# Patient Record
Sex: FEMALE | Race: WHITE | HISPANIC OR LATINO | Employment: FULL TIME
[De-identification: names, ages, dates, MRNs, and addresses within clinical notes are randomized per-mention and may not be internally consistent; named-entity substitution may affect disease eponyms.]

---

## 2017-02-24 ENCOUNTER — RECORDS - HEALTHEAST (OUTPATIENT)
Dept: ADMINISTRATIVE | Facility: OTHER | Age: 52
End: 2017-02-24

## 2017-02-24 LAB
LAB AP CHARGES (HE HISTORICAL CONVERSION): NORMAL
PATH REPORT.COMMENTS IMP SPEC: NORMAL
PATH REPORT.COMMENTS IMP SPEC: NORMAL
PATH REPORT.FINAL DX SPEC: NORMAL
PATH REPORT.GROSS SPEC: NORMAL
PATH REPORT.MICROSCOPIC SPEC OTHER STN: NORMAL
PATH REPORT.RELEVANT HX SPEC: NORMAL
RESULT FLAG (HE HISTORICAL CONVERSION): NORMAL

## 2018-02-14 ENCOUNTER — AMBULATORY - HEALTHEAST (OUTPATIENT)
Dept: ADMINISTRATIVE | Facility: REHABILITATION | Age: 53
End: 2018-02-14

## 2018-02-14 DIAGNOSIS — H81.13 BPPV (BENIGN PAROXYSMAL POSITIONAL VERTIGO), BILATERAL: ICD-10-CM

## 2018-05-07 ENCOUNTER — RECORDS - HEALTHEAST (OUTPATIENT)
Dept: ADMINISTRATIVE | Facility: OTHER | Age: 53
End: 2018-05-07

## 2018-12-05 ENCOUNTER — RECORDS - HEALTHEAST (OUTPATIENT)
Dept: ADMINISTRATIVE | Facility: OTHER | Age: 53
End: 2018-12-05

## 2018-12-14 ASSESSMENT — MIFFLIN-ST. JEOR: SCORE: 1399.21

## 2018-12-19 ENCOUNTER — ANESTHESIA - HEALTHEAST (OUTPATIENT)
Dept: SURGERY | Facility: CLINIC | Age: 53
End: 2018-12-19

## 2018-12-19 ENCOUNTER — SURGERY - HEALTHEAST (OUTPATIENT)
Dept: SURGERY | Facility: CLINIC | Age: 53
End: 2018-12-19

## 2018-12-19 ASSESSMENT — MIFFLIN-ST. JEOR: SCORE: 1385.61

## 2021-05-25 ENCOUNTER — RECORDS - HEALTHEAST (OUTPATIENT)
Dept: ADMINISTRATIVE | Facility: CLINIC | Age: 56
End: 2021-05-25

## 2021-05-26 ENCOUNTER — RECORDS - HEALTHEAST (OUTPATIENT)
Dept: ADMINISTRATIVE | Facility: CLINIC | Age: 56
End: 2021-05-26

## 2021-05-27 ENCOUNTER — RECORDS - HEALTHEAST (OUTPATIENT)
Dept: ADMINISTRATIVE | Facility: CLINIC | Age: 56
End: 2021-05-27

## 2021-06-02 VITALS — HEIGHT: 61 IN | BODY MASS INDEX: 35.3 KG/M2 | WEIGHT: 187 LBS

## 2021-06-05 ENCOUNTER — HEALTH MAINTENANCE LETTER (OUTPATIENT)
Age: 56
End: 2021-06-05

## 2021-06-22 NOTE — ANESTHESIA CARE TRANSFER NOTE
Last vitals:   Vitals:    12/19/18 1145   BP: 106/60   Pulse: 81   Resp: 15   Temp: 36.8  C (98.2  F)   SpO2: 98%     Patient's level of consciousness is drowsy  Spontaneous respirations: yes  Maintains airway independently: yes  Dentition unchanged: yes  Oropharynx: oropharynx clear of all foreign objects    QCDR Measures:  ASA# 20 - Surgical Safety Checklist: WHO surgical safety checklist completed prior to induction    PQRS# 430 - Adult PONV Prevention: 4558F - Pt received => 2 anti-emetic agents (different classes) preop & intraop  ASA# 8 - Peds PONV Prevention: NA - Not pediatric patient, not GA or 2 or more risk factors NOT present  PQRS# 424 - Phyllis-op Temp Management: 4559F - At least one body temp DOCUMENTED => 35.5C or 95.9F within required timeframe  PQRS# 426 - PACU Transfer Protocol: - Transfer of care checklist used  ASA# 14 - Acute Post-op Pain: ASA14B - Patient did NOT experience pain >= 7 out of 10

## 2021-06-22 NOTE — ANESTHESIA PREPROCEDURE EVALUATION
Anesthesia Evaluation      Patient summary reviewed   No history of anesthetic complications     Airway   Mallampati: II   Pulmonary     breath sounds clear to auscultation  (+) shortness of breath, a smoker                         Cardiovascular - normal exam  (+) hypertension, ,      Neuro/Psych    (+) anxiety/panic attacks,     Endo/Other    (+) diabetes mellitus,      GI/Hepatic/Renal - negative ROS           Dental                         Anesthesia Plan  Planned anesthetic: MAC    ASA 2     Anesthetic plan and risks discussed with: patient

## 2021-06-22 NOTE — ANESTHESIA POSTPROCEDURE EVALUATION
Patient: Alanna Lewis Ling  RETROPUBIC SLING (LYNX), CYSTOSCOPY  Anesthesia type: MAC    Patient location: PACU  Last vitals:   Vitals:    12/19/18 1300   BP: 106/62   Pulse: 72   Resp: 16   Temp:    SpO2:      Post vital signs: stable  Level of consciousness: awake and responds to simple questions  Post-anesthesia pain: pain controlled  Post-anesthesia nausea and vomiting: no  Pulmonary: unassisted, return to baseline  Cardiovascular: stable and blood pressure at baseline  Hydration: adequate  Anesthetic events: no    QCDR Measures:  ASA# 11 - Phyllis-op Cardiac Arrest: ASA11B - Patient did NOT experience unanticipated cardiac arrest  ASA# 12 - Phyllis-op Mortality Rate: ASA12B - Patient did NOT die  ASA# 13 - PACU Re-Intubation Rate: ASA13B - Patient did NOT require a new airway mgmt  ASA# 10 - Composite Anes Safety: ASA10A - No serious adverse event    Additional Notes:

## 2021-07-21 ENCOUNTER — RECORDS - HEALTHEAST (OUTPATIENT)
Dept: ADMINISTRATIVE | Facility: CLINIC | Age: 56
End: 2021-07-21

## 2021-08-18 ENCOUNTER — APPOINTMENT (OUTPATIENT)
Dept: RADIOLOGY | Facility: CLINIC | Age: 56
End: 2021-08-18
Payer: COMMERCIAL

## 2021-08-18 ENCOUNTER — HOSPITAL ENCOUNTER (EMERGENCY)
Facility: CLINIC | Age: 56
Discharge: HOME | End: 2021-08-18
Attending: EMERGENCY MEDICINE | Admitting: EMERGENCY MEDICINE
Payer: COMMERCIAL

## 2021-08-18 VITALS
OXYGEN SATURATION: 95 % | DIASTOLIC BLOOD PRESSURE: 76 MMHG | HEART RATE: 66 BPM | HEIGHT: 61 IN | SYSTOLIC BLOOD PRESSURE: 139 MMHG | WEIGHT: 209.9 LBS | BODY MASS INDEX: 39.63 KG/M2 | RESPIRATION RATE: 18 BRPM

## 2021-08-18 DIAGNOSIS — S46.812A STRAIN OF LEFT TRAPEZIUS MUSCLE, INITIAL ENCOUNTER: Primary | ICD-10-CM

## 2021-08-18 LAB
ANION GAP SERPL CALCULATED.3IONS-SCNC: 8 MMOL/L (ref 5–18)
ATRIAL RATE - MUSE: 78 BPM
BASOPHILS # BLD AUTO: 0 10E3/UL (ref 0–0.2)
BASOPHILS NFR BLD AUTO: 0 %
BUN SERPL-MCNC: 12 MG/DL (ref 8–22)
CALCIUM SERPL-MCNC: 10.1 MG/DL (ref 8.5–10.5)
CHLORIDE BLD-SCNC: 105 MMOL/L (ref 98–107)
CO2 SERPL-SCNC: 26 MMOL/L (ref 22–31)
CREAT SERPL-MCNC: 0.74 MG/DL (ref 0.6–1.1)
D DIMER PPP FEU-MCNC: 0.32 UG/ML FEU (ref 0–0.5)
DIASTOLIC BLOOD PRESSURE - MUSE: 79 MMHG
EOSINOPHIL # BLD AUTO: 0.2 10E3/UL (ref 0–0.7)
EOSINOPHIL NFR BLD AUTO: 3 %
ERYTHROCYTE [DISTWIDTH] IN BLOOD BY AUTOMATED COUNT: 13.1 % (ref 10–15)
GFR SERPL CREATININE-BSD FRML MDRD: >90 ML/MIN/1.73M2
GLUCOSE BLD-MCNC: 141 MG/DL (ref 70–125)
HCT VFR BLD AUTO: 39.1 % (ref 35–47)
HGB BLD-MCNC: 12.4 G/DL (ref 11.7–15.7)
IMM GRANULOCYTES # BLD: 0 10E3/UL
IMM GRANULOCYTES NFR BLD: 0 %
INTERPRETATION ECG - MUSE: NORMAL
LYMPHOCYTES # BLD AUTO: 2.4 10E3/UL (ref 0.8–5.3)
LYMPHOCYTES NFR BLD AUTO: 35 %
MCH RBC QN AUTO: 27.5 PG (ref 26.5–33)
MCHC RBC AUTO-ENTMCNC: 31.7 G/DL (ref 31.5–36.5)
MCV RBC AUTO: 87 FL (ref 78–100)
MONOCYTES # BLD AUTO: 0.5 10E3/UL (ref 0–1.3)
MONOCYTES NFR BLD AUTO: 8 %
NEUTROPHILS # BLD AUTO: 3.7 10E3/UL (ref 1.6–8.3)
NEUTROPHILS NFR BLD AUTO: 54 %
NRBC # BLD AUTO: 0 10E3/UL
NRBC BLD AUTO-RTO: 0 /100
P AXIS - MUSE: 59 DEGREES
PLATELET # BLD AUTO: 329 10E3/UL (ref 150–450)
POTASSIUM BLD-SCNC: 4.1 MMOL/L (ref 3.5–5)
PR INTERVAL - MUSE: 142 MS
QRS DURATION - MUSE: 68 MS
QT - MUSE: 380 MS
QTC - MUSE: 433 MS
R AXIS - MUSE: 41 DEGREES
RBC # BLD AUTO: 4.51 10E6/UL (ref 3.8–5.2)
SODIUM SERPL-SCNC: 139 MMOL/L (ref 136–145)
SYSTOLIC BLOOD PRESSURE - MUSE: 151 MMHG
T AXIS - MUSE: 44 DEGREES
TROPONIN I SERPL-MCNC: <0.01 NG/ML (ref 0–0.29)
VENTRICULAR RATE- MUSE: 78 BPM
WBC # BLD AUTO: 6.8 10E3/UL (ref 4–11)

## 2021-08-18 PROCEDURE — 93005 ELECTROCARDIOGRAM TRACING: CPT | Performed by: EMERGENCY MEDICINE

## 2021-08-18 PROCEDURE — 85379 FIBRIN DEGRADATION QUANT: CPT | Performed by: STUDENT IN AN ORGANIZED HEALTH CARE EDUCATION/TRAINING PROGRAM

## 2021-08-18 PROCEDURE — 84484 ASSAY OF TROPONIN QUANT: CPT | Performed by: STUDENT IN AN ORGANIZED HEALTH CARE EDUCATION/TRAINING PROGRAM

## 2021-08-18 PROCEDURE — 99284 EMERGENCY DEPT VISIT MOD MDM: CPT | Mod: 25

## 2021-08-18 PROCEDURE — 85025 COMPLETE CBC W/AUTO DIFF WBC: CPT | Performed by: STUDENT IN AN ORGANIZED HEALTH CARE EDUCATION/TRAINING PROGRAM

## 2021-08-18 PROCEDURE — 250N000013 HC RX MED GY IP 250 OP 250 PS 637: Performed by: STUDENT IN AN ORGANIZED HEALTH CARE EDUCATION/TRAINING PROGRAM

## 2021-08-18 PROCEDURE — 80048 BASIC METABOLIC PNL TOTAL CA: CPT | Performed by: STUDENT IN AN ORGANIZED HEALTH CARE EDUCATION/TRAINING PROGRAM

## 2021-08-18 PROCEDURE — 71045 X-RAY EXAM CHEST 1 VIEW: CPT

## 2021-08-18 PROCEDURE — 36415 COLL VENOUS BLD VENIPUNCTURE: CPT | Performed by: STUDENT IN AN ORGANIZED HEALTH CARE EDUCATION/TRAINING PROGRAM

## 2021-08-18 PROCEDURE — 93005 ELECTROCARDIOGRAM TRACING: CPT

## 2021-08-18 RX ORDER — OXYCODONE HYDROCHLORIDE 5 MG/1
5 TABLET ORAL ONCE
Status: DISCONTINUED | OUTPATIENT
Start: 2021-08-18 | End: 2021-08-18 | Stop reason: HOSPADM

## 2021-08-18 RX ORDER — ACETAMINOPHEN 325 MG/1
650 TABLET ORAL ONCE
Status: COMPLETED | OUTPATIENT
Start: 2021-08-18 | End: 2021-08-18

## 2021-08-18 RX ADMIN — ACETAMINOPHEN 650 MG: 325 TABLET ORAL at 09:20

## 2021-08-18 ASSESSMENT — ENCOUNTER SYMPTOMS
ENDOCRINE NEGATIVE: 1
HEADACHES: 0
COLOR CHANGE: 0
DYSURIA: 0
NERVOUS/ANXIOUS: 1
DIZZINESS: 0
ABDOMINAL PAIN: 0
PALPITATIONS: 0
NAUSEA: 0
HEMATOLOGIC/LYMPHATIC NEGATIVE: 1
CHEST TIGHTNESS: 1
EYES NEGATIVE: 1
CONSTIPATION: 1
CONSTITUTIONAL NEGATIVE: 1
FREQUENCY: 0
DIARRHEA: 0
ALLERGIC/IMMUNOLOGIC NEGATIVE: 1
BACK PAIN: 1

## 2021-08-18 ASSESSMENT — MIFFLIN-ST. JEOR: SCORE: 1489.48

## 2021-08-18 NOTE — ED TRIAGE NOTES
Patient states intermittent left sided chest pain for 3 days that started in her back, awoke today and was worse.  History of anxiety, HTN, takes medication for anxiety.

## 2021-08-18 NOTE — ED PROVIDER NOTES
Emergency Department Encounter     Evaluation Date & Time:   8/18/2021  8:37 AM    CHIEF COMPLAINT:  Chest Pain      Triage Note:Patient states intermittent left sided chest pain for 3 days that started in her back, awoke today and was worse.  History of anxiety, HTN, takes medication for anxiety.        Impression and Plan     ED COURSE & MEDICAL DECISION MAKING:    Intermittent left sided chest pain that began in her back, exam shows reproducible tenderness of left trapezius, negative troponin, d-dimer and EKG. CXR clear for pneumonia. Ruled out PE, dissection given normal dimer. More likely trapezius muscle strain. Agreeable to tylenol for symptom management.    At the conclusion of the encounter I discussed the results of all the tests and the disposition. The questions were answered. The patient or family acknowledged understanding and was agreeable with the care plan.    FINAL IMPRESSION:  No diagnosis found.      Reassessments & Consults       MEDICATIONS GIVEN IN THE EMERGENCY DEPARTMENT:  Medications   oxyCODONE (ROXICODONE) tablet 5 mg (has no administration in time range)   acetaminophen (TYLENOL) tablet 650 mg (650 mg Oral Given 8/18/21 0920)       NEW PRESCRIPTIONS STARTED AT TODAY'S ED VISIT:  New Prescriptions    No medications on file       HPI     HPI     Alanna Ling is a 56 year old female with a pertinent history of HTN, anxiety, T2DM who presents to this ED [facility] for evaluation of back pain that radiates to her chest that started 3 days ago. No history of mechanical back trauma or injury, does walk the dogs which pulls on her right arm. Pain was initially medial to her left scapula and now radiates to her chest, her pain is pleuritic, has not taken any medication for her symptoms. No previous MI/ACS history, no hx of DVT, no PE, not on OCPs. No systemic symptoms.    REVIEW OF SYSTEMS:  Review of Systems   Constitutional: Negative.    HENT: Negative.    Eyes: Negative.    Respiratory:  Positive for chest tightness.    Cardiovascular: Positive for chest pain. Negative for palpitations.   Gastrointestinal: Positive for constipation. Negative for abdominal pain, diarrhea and nausea.   Endocrine: Negative.    Genitourinary: Negative for dysuria and frequency.   Musculoskeletal: Positive for back pain.   Skin: Negative for color change.   Allergic/Immunologic: Negative.    Neurological: Negative for dizziness and headaches.   Hematological: Negative.    Psychiatric/Behavioral: The patient is nervous/anxious.      10 point ROS of systems including Constitutional, Eyes, Respiratory, Cardiovascular, Gastroenterology, Genitourinary, Integumentary, Muscularskeletal, Psychiatric were all negative except for pertinent positives noted in my HPI.      Medical History     No past medical history on file.    Past Surgical History:   Procedure Laterality Date     APPENDECTOMY       HC SLING OPERATION FOR STRESS INCONTINENCE N/A 12/19/2018    Procedure: RETROPUBIC SLING (LYNX), CYSTOSCOPY;  Surgeon: Yeyo Pickett MD;  Location: [facility];  Service: Urology     OTHER SURGICAL HISTORY  20 yrs ago    butt implants     TONSILLECTOMY         Family History   Problem Relation Age of Onset     Cancer Paternal Grandfather      Obesity Mother      Hypertension Mother      Arthritis Mother      Heart Disease Father      Hyperlipidemia Father      Hypertension Father      Cerebrovascular Disease Father      Obesity Sister      Hypertension Sister      Hyperlipidemia Brother      Hypertension Brother      Cancer Paternal Grandmother        Social History     Tobacco Use     Smoking status: Former Smoker     Smokeless tobacco: Never Used     Tobacco comment: quit 30 yrs ago   Substance Use Topics     Alcohol use: Yes     Comment: Alcoholic Drinks/day: Socially     Drug use: No       cholecalciferol, vitamin D3, (VITAMIN D3) 2,000 unit Tab  losartan-hydrochlorothiazide (HYZAAR) 50-12.5 mg per tablet  metFORMIN  "(GLUCOPHAGE-XR) 500 MG 24 hr tablet  oxyCODONE-acetaminophen (PERCOCET) 5-325 mg per tablet  rizatriptan (MAXALT-MLT) 10 MG disintegrating tablet  sertraline (ZOLOFT) 100 MG tablet        Physical Exam     First Vitals:  Patient Vitals for the past 24 hrs:   BP Pulse Resp SpO2 Height Weight   08/18/21 1019 131/77 70 16 98 % -- --   08/18/21 0900 127/72 76 15 99 % -- --   08/18/21 0839 (!) 151/79 -- -- -- 1.549 m (5' 1\") 95.2 kg (209 lb 14.4 oz)       PHYSICAL EXAM:   Physical Exam  Vitals reviewed.   Constitutional:       Appearance: She is well-developed.   HENT:      Head: Normocephalic.   Eyes:      Extraocular Movements: Extraocular movements intact.      Pupils: Pupils are equal, round, and reactive to light.   Cardiovascular:      Rate and Rhythm: Normal rate and regular rhythm.      Heart sounds: Normal heart sounds.   Pulmonary:      Effort: Pulmonary effort is normal.      Breath sounds: Normal breath sounds.   Chest:      Chest wall: No mass or tenderness.   Abdominal:      General: Bowel sounds are normal.      Palpations: Abdomen is soft.   Musculoskeletal:      Cervical back: Normal range of motion.      Comments: Reproducible tenderness on the right Trapezoid   Skin:     General: Skin is warm.      Capillary Refill: Capillary refill takes less than 2 seconds.   Neurological:      General: No focal deficit present.      Mental Status: She is alert. She is disoriented.   Psychiatric:         Mood and Affect: Mood normal.         Behavior: Behavior normal.         Results     LAB:  All pertinent labs reviewed and interpreted  Labs Ordered and Resulted from Time of ED Arrival Up to the Time of Departure from the ED   BASIC METABOLIC PANEL - Abnormal; Notable for the following components:       Result Value    Glucose 141 (*)     All other components within normal limits   TROPONIN I - Normal   D DIMER QUANTITATIVE - Normal    Narrative:     This D-dimer assay is intended for use in conjunction with a " clinical pretest probability assessment model to exclude pulmonary embolism (PE) and deep venous thrombosis (DVT) in outpatients suspected of PE or DVT. The cut-off value is 0.50 ug/mL FEU.   CBC WITH PLATELETS & DIFFERENTIAL    Narrative:     The following orders were created for panel order CBC with platelets differential.  Procedure                               Abnormality         Status                     ---------                               -----------         ------                     CBC with platelets and d...[[id number]]                      Final result                 Please view results for these tests on the individual orders.   CBC WITH PLATELETS AND DIFFERENTIAL   PERIPHERAL IV CATHETER       RADIOLOGY:  XR Chest Port 1 View   Final Result   IMPRESSION: Negative chest.                 ECG:  EKG was done.  Performed at: ED    Impression: Normal no prior comparison    Rate: 78  Rhythm: NSR  MD Interval: 142  QRS Interval: 68  QTc Interval: 433  ST Changes: None  Comparison: No prior      I have independently reviewed and interpreted the EKS(s) documented above    Benedicto Elmore MD  Emergency Medicine  [facility] EMERGENCY ROOM       Catarina, Benedicto Bojorquez MD  Resident  08/18/21 0645

## 2021-08-18 NOTE — ED PROVIDER NOTES
"ED CONSULTATION  Date/Time:8/18/2021 9:13 AM    I am seeing this patient along with the resident.  I, Lyn Castillo, DO have reviewed the documentation, personally taken the patient's history, performed an exam and agree with the physical finds, diagnosis and management plan.    HPI: Alanna Ling is a 56 year old female with a pertinent history of HTN, anxiety, T2DM who presents to this ED [facility] for evaluation of back pain that radiates to her chest that started 3 days ago. No history of mechanical back trauma or injury, does walk the dogs which pulls on her right arm. Pain was initially medial to her left scapula and now radiates to her chest, her pain is pleuritic, has not taken any medication for her symptoms. No previous MI/ACS history, no hx of DVT, no PE, not on OCPs. No systemic symptoms.    Physical Exam:/76   Pulse 66   Resp 18   Ht 1.549 m (5' 1\")   Wt 95.2 kg (209 lb 14.4 oz)   SpO2 95%   BMI 39.66 kg/m    Constitutional:  Well developed, Well nourished  HENT:  Normocephalic, Atraumatic, Bilateral external ears normal, Oropharynx moist, No oral exudates, Nose normal. Neck- Normal range of motion   Eyes: Conjunctiva normal, No discharge.   Respiratory:  Normal breath sounds, No respiratory distress, No wheezing  Cardiovascular:  Normal heart rate, Normal rhythm. Reproducible left anterior chest wall and left scapula tenderness. 2+ radial pulses bilaterally  GI:  Soft, No tenderness, No masses, No flank tenderness.   Musculoskeletal: Full ROM  Integument:  Warm, Dry, No erythema, No rash.    Neurologic:  Alert & oriented.  No focal deficits appreciated  Psychiatric:  Affect normal, Judgment normal, Mood normal.     EKG:    Performed at: 8:44 AM    Rate: 78  Rhythm: sinus rhythm  Axis: 41  LA: 142  QRS: 68  QTC: 433  Sinus rhythm, septal infarct, age undetermined   Comparison: No previous available for comparison.    I have independently review and interpreted the EKG, pending final " cardiology read.      MDM: Patient presenting for evaluation of left-sided chest pain.  Symptoms started this evening.  She has been having a few days of left scapular tenderness.  No known trauma.  Now wraparound of from peer exacerbated by deep breath and movement.  EKG without acute ischemic findings.  Some of her pain is reproducible.  Given the pleuritic nature, will obtain a D-dimer.  She is low risk Wells.  Not consistent with a dissection.  D-dimer negative.  Troponin negative.  Labs otherwise unremarkable.  CXR normal.  Symptoms most consistent with a musculoskeletal cause.  Discussed symptomatic care and return precautions.         1. Strain of left trapezius muscle, initial encounter        Results for orders placed or performed during the hospital encounter of 08/18/21   XR Chest Port 1 View    Impression    IMPRESSION: Negative chest.   Basic metabolic panel   Result Value Ref Range    Sodium 139 136 - 145 mmol/L    Potassium 4.1 3.5 - 5.0 mmol/L    Chloride 105 98 - 107 mmol/L    Carbon Dioxide (CO2) 26 22 - 31 mmol/L    Anion Gap 8 5 - 18 mmol/L    Urea Nitrogen 12 8 - 22 mg/dL    Creatinine 0.74 0.60 - 1.10 mg/dL    Calcium 10.1 8.5 - 10.5 mg/dL    Glucose 141 (H) 70 - 125 mg/dL    GFR Estimate >90 >60 mL/min/1.73m2   Result Value Ref Range    Troponin I <0.01 0.00 - 0.29 ng/mL   D dimer quantitative   Result Value Ref Range    D-Dimer Quantitative 0.32 0.00 - 0.50 ug/mL FEU   CBC with platelets and differential   Result Value Ref Range    WBC Count 6.8 4.0 - 11.0 10e3/uL    RBC Count 4.51 3.80 - 5.20 10e6/uL    Hemoglobin 12.4 11.7 - 15.7 g/dL    Hematocrit 39.1 35.0 - 47.0 %    MCV 87 78 - 100 fL    MCH 27.5 26.5 - 33.0 pg    MCHC 31.7 31.5 - 36.5 g/dL    RDW 13.1 10.0 - 15.0 %    Platelet Count 329 150 - 450 10e3/uL    % Neutrophils 54 %    % Lymphocytes 35 %    % Monocytes 8 %    % Eosinophils 3 %    % Basophils 0 %    % Immature Granulocytes 0 %    NRBCs per 100 WBC 0 <1 /100    Absolute  Neutrophils 3.7 1.6 - 8.3 10e3/uL    Absolute Lymphocytes 2.4 0.8 - 5.3 10e3/uL    Absolute Monocytes 0.5 0.0 - 1.3 10e3/uL    Absolute Eosinophils 0.2 0.0 - 0.7 10e3/uL    Absolute Basophils 0.0 0.0 - 0.2 10e3/uL    Absolute Immature Granulocytes 0.0 <=0.0 10e3/uL    Absolute NRBCs 0.0 10e3/uL   ECG 12-LEAD WITH MUSE (LHE)   Result Value Ref Range    Systolic Blood Pressure 151 mmHg    Diastolic Blood Pressure 79 mmHg    Ventricular Rate 78 BPM    Atrial Rate 78 BPM    SC Interval 142 ms    QRS Duration 68 ms     ms    QTc 433 ms    P Axis 59 degrees    R AXIS 41 degrees    T Axis 44 degrees    Interpretation ECG       Sinus rhythm  Septal infarct , age undetermined  Abnormal ECG  No previous ECGs available  Confirmed by SEE ED PROVIDER NOTE FOR, ECG INTERPRETATION ([id number]),  MARLY SANCHEZ ([id number]) on 8/18/2021 4:48:44 PM       XR Chest Port 1 View   Final Result   IMPRESSION: Negative chest.           Discharge Medication List as of 8/18/2021 11:23 AM            DO Jonathan Rosas Christine E, DO  08/19/21 1136

## 2021-09-25 ENCOUNTER — HEALTH MAINTENANCE LETTER (OUTPATIENT)
Age: 56
End: 2021-09-25

## 2021-11-29 NOTE — TELEPHONE ENCOUNTER
RECORDS RECEIVED FROM: Care Everywhere   Appt Date: 11.30.2021   NOTES STATUS DETAILS   OFFICE NOTE from referring provider N/A    OFFICE NOTES from other specialists [facility] Everywhere 11.01.2021 Isai Brown,      DISCHARGE SUMMARY from hospital [facility] Everywhere 11.06.2021 [facility]   MEDICATION LIST Care Everywhere / Internal    LIVER BIOSPY (IF APPLICABLE)      PATHOLOGY REPORTS  N/A    IMAGING     ENDOSCOPY (IF AVAILABLE) N/A    COLONOSCOPY (IF AVAILABLE) [facility] Everywhere 10.17.2019   ULTRASOUND LIVER N/A    CT OF ABDOMEN N/A    MRI OF LIVER N/A    FIBROSCAN, US ELASTOGRAPHY, FIBROSIS SCAN, MR ELASTOGRAPHY N/A    LABS     HEPATIC PANEL (LIVER PANEL) Care Every[facility] 11.10.2021   BASIC METABOLIC PANEL Care Everywhere 09.23.2021   COMPLETE METABOLIC PANEL Care Everywhere 11.10.2021   COMPLETE BLOOD COUNT (CBC) Care Everywhere 11.10.2021   INTERNATIONAL NORMALIZED RATIO (INR) Care Everywhere 11.10.2021   HEPATITIS C ANTIBODY N/A    HEPATITIS C VIRAL LOAD/PCR N/A    HEPATITIS C GENOTYPE N/A    HEPATITIS B SURFACE ANTIGEN N/A    HEPATITIS B SURFACE ANTIBODY Care Everywhere 10.11.2021   HEPATITIS B DNA QUANT LEVEL N/A    HEPATITIS B CORE ANTIBODY N/A

## 2021-11-30 ENCOUNTER — PRE VISIT (OUTPATIENT)
Dept: GASTROENTEROLOGY | Facility: CLINIC | Age: 56
End: 2021-11-30
Payer: COMMERCIAL

## 2021-11-30 ENCOUNTER — OFFICE VISIT (OUTPATIENT)
Dept: GASTROENTEROLOGY | Facility: CLINIC | Age: 56
End: 2021-11-30
Attending: INTERNAL MEDICINE
Payer: COMMERCIAL

## 2021-11-30 VITALS
TEMPERATURE: 98.4 F | HEIGHT: 61 IN | SYSTOLIC BLOOD PRESSURE: 118 MMHG | HEART RATE: 82 BPM | WEIGHT: 206.3 LBS | BODY MASS INDEX: 38.95 KG/M2 | DIASTOLIC BLOOD PRESSURE: 77 MMHG | OXYGEN SATURATION: 96 %

## 2021-11-30 DIAGNOSIS — K76.0 FATTY LIVER DISEASE, NONALCOHOLIC: Primary | ICD-10-CM

## 2021-11-30 DIAGNOSIS — I10 ESSENTIAL HYPERTENSION: ICD-10-CM

## 2021-11-30 DIAGNOSIS — E66.01 CLASS 2 SEVERE OBESITY DUE TO EXCESS CALORIES WITH SERIOUS COMORBIDITY AND BODY MASS INDEX (BMI) OF 38.0 TO 38.9 IN ADULT (H): ICD-10-CM

## 2021-11-30 DIAGNOSIS — Z01.818 PRE-OP EXAM: ICD-10-CM

## 2021-11-30 DIAGNOSIS — E88.819 INSULIN RESISTANCE: ICD-10-CM

## 2021-11-30 DIAGNOSIS — E66.812 CLASS 2 SEVERE OBESITY DUE TO EXCESS CALORIES WITH SERIOUS COMORBIDITY AND BODY MASS INDEX (BMI) OF 38.0 TO 38.9 IN ADULT (H): ICD-10-CM

## 2021-11-30 PROCEDURE — 99204 OFFICE O/P NEW MOD 45 MIN: CPT | Performed by: INTERNAL MEDICINE

## 2021-11-30 RX ORDER — HYDROCHLOROTHIAZIDE 12.5 MG/1
25 CAPSULE ORAL
COMMUNITY
Start: 2021-04-26 | End: 2024-06-20

## 2021-11-30 ASSESSMENT — MIFFLIN-ST. JEOR: SCORE: 1473.15

## 2021-11-30 ASSESSMENT — PAIN SCALES - GENERAL: PAINLEVEL: NO PAIN (0)

## 2021-11-30 NOTE — Clinical Note
Can you please fax the hepatic panel order (for 2 weeks from now) to [facility]    Grete - can you follow up these labs in 2 weeks?  If same or elevated - perc liver biopsy.  If improved further, proceed with sleeve gastrectomy

## 2021-11-30 NOTE — PROGRESS NOTES
Hepatic panel order faxed to [facility] to be drawn in 2 weeks. As requested by Dr. Leventhal.    Roxann KEMP LPN  [facility]atology Clinic

## 2021-11-30 NOTE — LETTER
11/30/2021    RE: Alanna Ling  [address]  [facility] [address]    Dear Colleague,    Thank you for referring your patient, Alanna Ling, to the [facility] [place]. Please see a copy of my visit note below.    Date of Service: 11/30/2021     Referring Provider: MERRY Young    Subjective:            Alanna Ling is a 56 year old female presenting for evaluation of abnormal liver tests    History of Present Illness   Alanna Ling is a 56 year old female with past medical history of obesity, hypertension, insulin resistance who presents with concerns of abnormal liver tests.    She reports that she has been overweight for much of her adult life.  Notes for many years she was very active physically, and working out several times per week.  Noted that her mother and sister have had issues with insulin resistance and fatty liver disease.  Notes that since the pandemic she has had less activity overall.  Despite her aggressive attempts at weight loss including dieting and exercise, she has had limited success and so she had been interested in pursuing a bariatric option.  She was seen in consultation with bariatric surgery and was scheduled for a sleeve gastrectomy on November 6.  Unfortunately she did not undergo this exam secondary to abnormal liver tests.  In review of the charts noted that she has had mildly elevated ALT and AST since initially checked in 2015 and intermittently so since 2018.  On November 1 she was noted to have an ALT of 265, .  Just prior to these lab evaluation she underwent an MRI with elastography on September 15.  This exam demonstrated diffuse hepatic steatosis, and the elastography demonstrated a stiffness of 2.8 kPa, consistent with essentially normal parenchyma.  Repeat labs were performed on November 29 which demonstrated , , INR 1.0.  Further lab evaluation has  demonstrated normal RERE, AMA, and anti-smooth muscle antibody.    Denies a history of significant alcohol use and denies a history of IV or inhaled drugs of abuse.  Notes remote history of smoking.  She works for [employer].    We did have a discussion about the Covid vaccine, and her reasons for not pursuing vaccination at this point.    Past Medical History:  Diabetes  Obesity  Hypertension    Surgical History:  Past Surgical History:   Procedure Laterality Date     APPENDECTOMY       HC SLING OPERATION FOR STRESS INCONTINENCE N/A 12/19/2018    Procedure: RETROPUBIC SLING (LYNX), CYSTOSCOPY;  Surgeon: Yeyo Pickett MD;  Location: [facility];  Service: Urology     OTHER SURGICAL HISTORY  20 yrs ago    butt implants     TONSILLECTOMY         Social History:  Social History     Tobacco Use     Smoking status: Former Smoker     Smokeless tobacco: Never Used     Tobacco comment: quit 30 yrs ago   Substance Use Topics     Alcohol use: Yes     Comment: Alcoholic Drinks/day: Socially     Drug use: No        Family History:  Family History   Problem Relation Age of Onset     Cancer Paternal Grandfather      Obesity Mother      Hypertension Mother      Arthritis Mother      Heart Disease Father      Hyperlipidemia Father      Hypertension Father      Cerebrovascular Disease Father      Obesity Sister      Hypertension Sister      Hyperlipidemia Brother      Hypertension Brother      Cancer Paternal Grandmother        Medications:  Current Outpatient Medications   Medication     cholecalciferol, vitamin D3, (VITAMIN D3) 2,000 unit Tab     hydrochlorothiazide (MICROZIDE) 12.5 MG capsule     losartan-hydrochlorothiazide (HYZAAR) 50-12.5 mg per tablet     metFORMIN (GLUCOPHAGE-XR) 500 MG 24 hr tablet     rizatriptan (MAXALT-MLT) 10 MG disintegrating tablet     sertraline (ZOLOFT) 100 MG tablet     oxyCODONE-acetaminophen (PERCOCET) 5-325 mg per tablet     No current facility-administered medications for this  "visit.       Review of Systems  A complete 10 point review of systems was asked and answered in the negative unless specifically commented upon in the HPI    Objective:         Vitals:    11/30/21 0840   BP: 118/77   Pulse: 82   Temp: 98.4  F (36.9  C)   TempSrc: Oral   SpO2: 96%   Weight: 93.6 kg (206 lb 4.8 oz)   Height: 1.549 m (5' 1\")     Body mass index is 38.98 kg/m .     Physical Exam    Vitals reviewed.   Constitutional: Well-developed, well-nourished, in no apparent distress.    HEENT: Normocephalic. no scleral icterus.  Neck/Lymph: Normal ROM, supple.   Cardiac:  Regular rate  Respiratory: Normal respiratory excursion   GI:  Abdomen soft, non-distended, obese. BS present.   Skin:  Skin is warm and dry. No jaundice. no spider nevi noted.  no palmar erythema  Peripheral Vascular: no lower extremity edema. 2+ pulses in all extremities  Musculoskeletal:  ROM intact, normal muscle bulk    Psychiatric: Normal mood and affect. Behavior is normal.  Neuro:  no asterixis, no tremor    Labs and Diagnostic tests:  Reviewed outside labs in depth    Imaging:  MR Elastography 9/15/2021  FINDINGS:     Liver: Smooth liver surface. Hepatic parenchyma demonstrates diffuse signal loss on out of phase imaging with a small area of sparing adjacent to the gallbladder fossa. No focal liver lesion identified.     MR Elastography: Region of interest measurements were obtained from 4 separate levels and were 3.1, 3.0, 2.6, and 2.6 kPa, for an average liver stiffness of 2.8 kPa.     < 2.5: Normal   2.5-2.9: Normal or inflammation.   2.9-3.5: Stage 1 to 2 fibrosis.   3.5-4: Stage 2 to 3 fibrosis.   4-5: Stage 3 to 4 fibrosis.   >5: Stage 4 fibrosis or cirrhosis.     Remainder of the abdomen: Normal appearance of the spleen, gallbladder, biliary tree, pancreas, adrenal glands, and kidneys. Normal caliber small and large bowel. No free fluid or bulky adenopathy. Normal caliber aorta. 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     Bones: No worrisome finding.          Assessment and Plan:    Abnormal Liver Tests:    -She has had a longstanding history of intermittent liver test elevations since at least 2015  -Does have a family history of insulin resistance in both her mother and her sister  -Imaging does demonstrate the presence of fatty deposition within the liver  -Lab evaluation for autoimmune causes of liver disease were negative and evaluation for viral causes of hepatitis were negative as well  -No new drug exposures to suggest high risk for DI LI  -Based on available data I do think she likely has nonalcoholic steatohepatitis, with acute elevation of unclear etiology. Regardless, her liver tests were trending towards normalization based on yesterday's labs  -The MR elastography was very reassuring that she had no overt changes to suggest cirrhosis or portal hypertension, and the elastography suggested against advanced hepatic fibrosis    Assessment and plan:  -From a hepatology perspective there is no overt contraindication to moving forward with a bariatric intervention, and in fact that the patient's liver tests and liver health will likely benefit dramatically from an aggressive weight loss plan  -Her mild hepatitis at this time is not a contraindication to either anesthesia nor surgical intervention  -If possible during her bariatric surgery, it would be an excellent addition to her health plan if she underwent a liver biopsy during the surgical intervention  -We will plan to repeat liver tests in 2 weeks, but unlikely that these would change this assessment    Non-Alcoholic Fatty Liver Disease  - I had a long discussion with the patient about nonalcoholic fatty liver disease (NAFLD).  We discussed how fat deposits in the liver, how this leads to inflammation, and how chronic inflammation (MIXON) can ultimately lead to scarring and cirrhosis.  The  "long-term complications of fatty liver disease were discussed with the patient, including the increased risk of cardiovascular disease complications,the risk of developing diabetes (if not already), and variable progression to cirrhosis and end stage liver disease.    Management of NAFLD/MIXON  - We spent time discussing an appropriate diet, exercise and weight loss plan.      - Recommend exercise regularly: 4+ times per week, with an average of about 45 minutes per day.      - It has shown that patients who exercise regularly can have improvement of insulin resistance and resolution of fatty liver disease, even if they are not able to lose weight.     - Recommend a low-carbohydrate, low-calorie diet (6351-3712 calories per day).    - An ideal weight loss plan would be to lose 7-10% of body weight over the next six months  - Recommend aggressive diabetes management: ideal goal Hgb A1c goal of =/< 7%. If possible addition of insulin sensitizing agents like metformin or liraglutide will be helpful.    - Management of cholesterol is also very important.    - The use of \"statins\" (HMG-CoA reductase medications) are an effective means of therapy and are not contra-indicated in those with abnormal liver tests OR those with cirrhosis.  The value of these medications in this population far outweigh the minor risks of abnormal liver tests.   - Goal LDL in those with MIXON are < 100 mg/dL  - Consider the utility of liberalizing coffee consumption as some data that this may slow progression and reverse effects of MIXON-related fibrosis.    Follow Up:  3 months     Thank you very much for the opportunity to participate in the care of this patient.  If you have any further questions, please don't hesitate to contact our office.    Thomas M. Leventhal, M.D.   of Medicine  Advanced & Transplant Hepatology  [facility]      Hepatic panel order faxed to [facility] to be drawn in 2 weeks. " As requested by Dr. Leventhal.    [facility]  [facility]atology Clinic

## 2021-11-30 NOTE — PROGRESS NOTES
Date of Service: 11/30/2021     Referring Provider: MERRY Young    Subjective:            Alanna Ling is a 56 year old female presenting for evaluation of abnormal liver tests    History of Present Illness   Alanna Ling is a 56 year old female with past medical history of obesity, hypertension, insulin resistance who presents with concerns of abnormal liver tests.    She reports that she has been overweight for much of her adult life.  Notes for many years she was very active physically, and working out several times per week.  Noted that her mother and sister have had issues with insulin resistance and fatty liver disease.  Notes that since the pandemic she has had less activity overall.  Despite her aggressive attempts at weight loss including dieting and exercise, she has had limited success and so she had been interested in pursuing a bariatric option.  She was seen in consultation with bariatric surgery and was scheduled for a sleeve gastrectomy on November 6.  Unfortunately she did not undergo this exam secondary to abnormal liver tests.  In review of the charts noted that she has had mildly elevated ALT and AST since initially checked in 2015 and intermittently so since 2018.  On November 1 she was noted to have an ALT of 265, .  Just prior to these lab evaluation she underwent an MRI with elastography on September 15.  This exam demonstrated diffuse hepatic steatosis, and the elastography demonstrated a stiffness of 2.8 kPa, consistent with essentially normal parenchyma.  Repeat labs were performed on November 29 which demonstrated , , INR 1.0.  Further lab evaluation has demonstrated normal RERE, AMA, and anti-smooth muscle antibody.    Denies a history of significant alcohol use and denies a history of IV or inhaled drugs of abuse.  Notes remote history of smoking.  She works for [employer].    We did have a discussion about the Covid vaccine,  and her reasons for not pursuing vaccination at this point.    Past Medical History:  Diabetes  Obesity  Hypertension    Surgical History:  Past Surgical History:   Procedure Laterality Date     APPENDECTOMY       HC SLING OPERATION FOR STRESS INCONTINENCE N/A 12/19/2018    Procedure: RETROPUBIC SLING (LYNX), CYSTOSCOPY;  Surgeon: Yeyo Pickett MD;  Location: [facility];  Service: Urology     OTHER SURGICAL HISTORY  20 yrs ago    butt implants     TONSILLECTOMY         Social History:  Social History     Tobacco Use     Smoking status: Former Smoker     Smokeless tobacco: Never Used     Tobacco comment: quit 30 yrs ago   Substance Use Topics     Alcohol use: Yes     Comment: Alcoholic Drinks/day: Socially     Drug use: No        Family History:  Family History   Problem Relation Age of Onset     Cancer Paternal Grandfather      Obesity Mother      Hypertension Mother      Arthritis Mother      Heart Disease Father      Hyperlipidemia Father      Hypertension Father      Cerebrovascular Disease Father      Obesity Sister      Hypertension Sister      Hyperlipidemia Brother      Hypertension Brother      Cancer Paternal Grandmother        Medications:  Current Outpatient Medications   Medication     cholecalciferol, vitamin D3, (VITAMIN D3) 2,000 unit Tab     hydrochlorothiazide (MICROZIDE) 12.5 MG capsule     losartan-hydrochlorothiazide (HYZAAR) 50-12.5 mg per tablet     metFORMIN (GLUCOPHAGE-XR) 500 MG 24 hr tablet     rizatriptan (MAXALT-MLT) 10 MG disintegrating tablet     sertraline (ZOLOFT) 100 MG tablet     oxyCODONE-acetaminophen (PERCOCET) 5-325 mg per tablet     No current facility-administered medications for this visit.       Review of Systems  A complete 10 point review of systems was asked and answered in the negative unless specifically commented upon in the HPI    Objective:         Vitals:    11/30/21 0840   BP: 118/77   Pulse: 82   Temp: 98.4  F (36.9  C)   TempSrc: Oral   SpO2: 96%  "  Weight: 93.6 kg (206 lb 4.8 oz)   Height: 1.549 m (5' 1\")     Body mass index is 38.98 kg/m .     Physical Exam    Vitals reviewed.   Constitutional: Well-developed, well-nourished, in no apparent distress.    HEENT: Normocephalic. no scleral icterus.  Neck/Lymph: Normal ROM, supple.   Cardiac:  Regular rate  Respiratory: Normal respiratory excursion   GI:  Abdomen soft, non-distended, obese. BS present.   Skin:  Skin is warm and dry. No jaundice. no spider nevi noted.  no palmar erythema  Peripheral Vascular: no lower extremity edema. 2+ pulses in all extremities  Musculoskeletal:  ROM intact, normal muscle bulk    Psychiatric: Normal mood and affect. Behavior is normal.  Neuro:  no asterixis, no tremor    Labs and Diagnostic tests:  Reviewed outside labs in depth    Imaging:  MR Elastography 9/15/2021  FINDINGS:     Liver: Smooth liver surface. Hepatic parenchyma demonstrates diffuse signal loss on out of phase imaging with a small area of sparing adjacent to the gallbladder fossa. No focal liver lesion identified.     MR Elastography: Region of interest measurements were obtained from 4 separate levels and were 3.1, 3.0, 2.6, and 2.6 kPa, for an average liver stiffness of 2.8 kPa.     < 2.5: Normal   2.5-2.9: Normal or inflammation.   2.9-3.5: Stage 1 to 2 fibrosis.   3.5-4: Stage 2 to 3 fibrosis.   4-5: Stage 3 to 4 fibrosis.   >5: Stage 4 fibrosis or cirrhosis.     Remainder of the abdomen: Normal appearance of the spleen, gallbladder, biliary tree, pancreas, adrenal glands, and kidneys. Normal caliber small and large bowel. No free fluid or bulky adenopathy. Normal caliber aorta. 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     Bones: No worrisome finding.          Assessment and Plan:    Abnormal Liver Tests:    -She has had a longstanding history of intermittent liver test elevations since at least 2015  -Does have a " family history of insulin resistance in both her mother and her sister  -Imaging does demonstrate the presence of fatty deposition within the liver  -Lab evaluation for autoimmune causes of liver disease were negative and evaluation for viral causes of hepatitis were negative as well  -No new drug exposures to suggest high risk for DI LI  -Based on available data I do think she likely has nonalcoholic steatohepatitis, with acute elevation of unclear etiology. Regardless, her liver tests were trending towards normalization based on yesterday's labs  -The MR elastography was very reassuring that she had no overt changes to suggest cirrhosis or portal hypertension, and the elastography suggested against advanced hepatic fibrosis    Assessment and plan:  -From a hepatology perspective there is no overt contraindication to moving forward with a bariatric intervention, and in fact that the patient's liver tests and liver health will likely benefit dramatically from an aggressive weight loss plan  -Her mild hepatitis at this time is not a contraindication to either anesthesia nor surgical intervention  -If possible during her bariatric surgery, it would be an excellent addition to her health plan if she underwent a liver biopsy during the surgical intervention  -We will plan to repeat liver tests in 2 weeks, but unlikely that these would change this assessment    Non-Alcoholic Fatty Liver Disease  - I had a long discussion with the patient about nonalcoholic fatty liver disease (NAFLD).  We discussed how fat deposits in the liver, how this leads to inflammation, and how chronic inflammation (MIXON) can ultimately lead to scarring and cirrhosis.  The long-term complications of fatty liver disease were discussed with the patient, including the increased risk of cardiovascular disease complications,the risk of developing diabetes (if not already), and variable progression to cirrhosis and end stage liver disease.    Management  "of NAFLD/MIXON  - We spent time discussing an appropriate diet, exercise and weight loss plan.      - Recommend exercise regularly: 4+ times per week, with an average of about 45 minutes per day.      - It has shown that patients who exercise regularly can have improvement of insulin resistance and resolution of fatty liver disease, even if they are not able to lose weight.     - Recommend a low-carbohydrate, low-calorie diet (2743-6180 calories per day).    - An ideal weight loss plan would be to lose 7-10% of body weight over the next six months  - Recommend aggressive diabetes management: ideal goal Hgb A1c goal of =/< 7%. If possible addition of insulin sensitizing agents like metformin or liraglutide will be helpful.    - Management of cholesterol is also very important.    - The use of \"statins\" (HMG-CoA reductase medications) are an effective means of therapy and are not contra-indicated in those with abnormal liver tests OR those with cirrhosis.  The value of these medications in this population far outweigh the minor risks of abnormal liver tests.   - Goal LDL in those with MIXON are < 100 mg/dL  - Consider the utility of liberalizing coffee consumption as some data that this may slow progression and reverse effects of MIXON-related fibrosis.    Follow Up:  3 months     Thank you very much for the opportunity to participate in the care of this patient.  If you have any further questions, please don't hesitate to contact our office.    Thomas M. Leventhal, M.D.   of Medicine  Advanced & Transplant Hepatology  [facility]    "

## 2022-06-26 ENCOUNTER — HEALTH MAINTENANCE LETTER (OUTPATIENT)
Age: 57
End: 2022-06-26

## 2022-12-26 ENCOUNTER — HEALTH MAINTENANCE LETTER (OUTPATIENT)
Age: 57
End: 2022-12-26

## 2023-04-18 ENCOUNTER — ANCILLARY ORDERS (OUTPATIENT)
Dept: MAMMOGRAPHY | Facility: CLINIC | Age: 58
End: 2023-04-18

## 2023-04-18 DIAGNOSIS — Z12.31 VISIT FOR SCREENING MAMMOGRAM: ICD-10-CM

## 2023-07-09 ENCOUNTER — HEALTH MAINTENANCE LETTER (OUTPATIENT)
Age: 58
End: 2023-07-09

## 2023-11-01 LAB — RETINOPATHY: NORMAL

## 2024-01-25 ENCOUNTER — HOSPITAL ENCOUNTER (OUTPATIENT)
Dept: RADIOLOGY | Facility: CLINIC | Age: 59
Discharge: HOME | End: 2024-01-25
Attending: INTERNAL MEDICINE

## 2024-01-25 DIAGNOSIS — Z86.15 HISTORY OF LATENT TUBERCULOSIS: ICD-10-CM

## 2024-01-25 PROCEDURE — 999N000028 XR CHEST 1 VIEW, EMPLOYEE HEALTH

## 2024-06-20 ENCOUNTER — ANCILLARY PROCEDURE (OUTPATIENT)
Dept: GENERAL RADIOLOGY | Facility: CLINIC | Age: 59
End: 2024-06-20
Attending: STUDENT IN AN ORGANIZED HEALTH CARE EDUCATION/TRAINING PROGRAM
Payer: COMMERCIAL

## 2024-06-20 ENCOUNTER — OFFICE VISIT (OUTPATIENT)
Dept: FAMILY MEDICINE | Facility: CLINIC | Age: 59
End: 2024-06-20
Payer: COMMERCIAL

## 2024-06-20 VITALS
OXYGEN SATURATION: 98 % | RESPIRATION RATE: 16 BRPM | SYSTOLIC BLOOD PRESSURE: 139 MMHG | HEART RATE: 90 BPM | TEMPERATURE: 98.3 F | BODY MASS INDEX: 32.12 KG/M2 | WEIGHT: 170 LBS | DIASTOLIC BLOOD PRESSURE: 78 MMHG

## 2024-06-20 DIAGNOSIS — R05.2 SUBACUTE COUGH: ICD-10-CM

## 2024-06-20 DIAGNOSIS — Z75.8 DOES NOT HAVE PRIMARY CARE PROVIDER: ICD-10-CM

## 2024-06-20 DIAGNOSIS — R05.2 SUBACUTE COUGH: Primary | ICD-10-CM

## 2024-06-20 PROBLEM — F43.21 ADJUSTMENT DISORDER WITH DEPRESSED MOOD: Status: ACTIVE | Noted: 2023-07-14

## 2024-06-20 PROBLEM — E11.9 TYPE 2 DIABETES MELLITUS WITHOUT COMPLICATION, WITHOUT LONG-TERM CURRENT USE OF INSULIN (H): Status: ACTIVE | Noted: 2021-03-25

## 2024-06-20 PROBLEM — Z98.84 STATUS POST LAPAROSCOPIC SLEEVE GASTRECTOMY: Status: ACTIVE | Noted: 2022-01-28

## 2024-06-20 PROBLEM — F41.1 GENERALIZED ANXIETY DISORDER: Status: ACTIVE | Noted: 2017-04-03

## 2024-06-20 PROBLEM — E66.09 CLASS 2 OBESITY DUE TO EXCESS CALORIES IN ADULT: Status: ACTIVE | Noted: 2018-09-05

## 2024-06-20 PROBLEM — R74.01 TRANSAMINITIS: Status: ACTIVE | Noted: 2021-08-30

## 2024-06-20 PROBLEM — H81.10 BENIGN PAROXYSMAL POSITIONAL VERTIGO: Status: ACTIVE | Noted: 2024-06-20

## 2024-06-20 PROBLEM — K75.81 NASH (NONALCOHOLIC STEATOHEPATITIS): Status: ACTIVE | Noted: 2020-03-06

## 2024-06-20 PROBLEM — N39.498 OTHER URINARY INCONTINENCE: Status: ACTIVE | Noted: 2024-06-20

## 2024-06-20 PROBLEM — E78.5 HYPERLIPIDEMIA: Status: ACTIVE | Noted: 2022-05-27

## 2024-06-20 PROBLEM — F41.0 PANIC ATTACKS: Status: ACTIVE | Noted: 2023-07-14

## 2024-06-20 PROBLEM — E66.9 OBESITY (BMI 30-39.9): Status: ACTIVE | Noted: 2020-03-06

## 2024-06-20 PROBLEM — G43.009 MIGRAINE WITHOUT AURA AND WITHOUT STATUS MIGRAINOSUS, NOT INTRACTABLE: Status: ACTIVE | Noted: 2021-08-30

## 2024-06-20 PROBLEM — E66.3 OVERWEIGHT: Status: ACTIVE | Noted: 2024-06-20

## 2024-06-20 PROBLEM — E66.812 CLASS 2 OBESITY DUE TO EXCESS CALORIES IN ADULT: Status: ACTIVE | Noted: 2018-09-05

## 2024-06-20 PROBLEM — F32.A DEPRESSION: Status: ACTIVE | Noted: 2023-05-12

## 2024-06-20 PROBLEM — F41.1 ANXIETY STATE: Status: ACTIVE | Noted: 2024-06-20

## 2024-06-20 PROBLEM — K76.0 FATTY LIVER: Status: ACTIVE | Noted: 2021-08-30

## 2024-06-20 PROBLEM — R76.11 NONSPECIFIC REACTION TO TUBERCULIN SKIN TEST: Status: ACTIVE | Noted: 2024-06-20

## 2024-06-20 PROCEDURE — 71046 X-RAY EXAM CHEST 2 VIEWS: CPT | Mod: TC | Performed by: RADIOLOGY

## 2024-06-20 PROCEDURE — 99203 OFFICE O/P NEW LOW 30 MIN: CPT | Performed by: STUDENT IN AN ORGANIZED HEALTH CARE EDUCATION/TRAINING PROGRAM

## 2024-06-20 RX ORDER — CETIRIZINE HYDROCHLORIDE 10 MG/1
10 TABLET ORAL DAILY
Qty: 30 TABLET | Refills: 0 | Status: SHIPPED | OUTPATIENT
Start: 2024-06-20 | End: 2024-07-20

## 2024-06-20 RX ORDER — ESTRADIOL 0.04 MG/D
1 PATCH, EXTENDED RELEASE TRANSDERMAL
COMMUNITY
Start: 2024-04-24

## 2024-06-20 RX ORDER — FLUTICASONE PROPIONATE 50 MCG
1 SPRAY, SUSPENSION (ML) NASAL DAILY
Qty: 11.1 ML | Refills: 0 | Status: SHIPPED | OUTPATIENT
Start: 2024-06-20

## 2024-06-20 RX ORDER — PROGESTERONE 100 MG/1
100 CAPSULE ORAL AT BEDTIME
COMMUNITY
Start: 2024-06-10

## 2024-06-20 RX ORDER — BENZONATATE 100 MG/1
100 CAPSULE ORAL 3 TIMES DAILY PRN
Qty: 21 CAPSULE | Refills: 0 | Status: SHIPPED | OUTPATIENT
Start: 2024-06-20

## 2024-06-20 NOTE — PROGRESS NOTES
"Assessment & Plan     Subacute cough  - XR Chest 2 Views  - cetirizine (ZYRTEC) 10 MG tablet  Dispense: 30 tablet; Refill: 0  - fluticasone (FLONASE) 50 MCG/ACT nasal spray  Dispense: 11.1 mL; Refill: 0  - benzonatate (TESSALON) 100 MG capsule  Dispense: 21 capsule; Refill: 0    Does not have primary care provider  - Primary Care Referral    Considered long COVID vs allergies vs viral URI with cough. Sore throat most likely d/t post-nasal drip. Vitally stable and well appearing with benign exam. CXR without effusion, consolidation, or air atelectasis. No h/o GERD and endorses eating only bland food. We discussed the results of her imaging and exam. Recommended daily Zyrtec, Flonase, and follow up with PCP in 2 weeks if symptoms persist.     Return for In clinic with your primary care provider.    Gi Man, DO  she/her  [facility]    Subjective     Alanna Ling is a 59 year old female who presents to clinic today for the following health issues:    HPI    URI Adult    End of March/April 2024 got COVID and feels like the cough stayed. \"On and off,\" thought maybe related to allergies  1 week ago, the cough has been more frequent  Yesterday, woke up with a sore throat  Now is feeling dizzy  No h/o allergies  Former smoker  Is off all psych medications, started hormone treatment Progesterone pill, estrogen patch, testosterone cream  Tried: has not tried any medications for symptoms  Sleeve surgery 4 years ago, takes no supplements  No h/o GERD- eats very plain    No past medical history on file.    Allergies   Allergen Reactions    Ibuprofen Hives     blister on the face      Iodine Hives     IV Contrast    Nsaids (Non-Steroidal Anti-Inflammatory Drug) [Nsaids] Hives     Current Outpatient Medications   Medication Sig Dispense Refill    benzonatate (TESSALON) 100 MG capsule Take 1 capsule (100 mg) by mouth 3 times daily as needed for cough 21 capsule 0    cetirizine (ZYRTEC) 10 MG " tablet Take 1 tablet (10 mg) by mouth daily for 30 days 30 tablet 0    cholecalciferol, vitamin D3, (VITAMIN D3) 2,000 unit Tab [CHOLECALCIFEROL, VITAMIN D3, (VITAMIN D3) 2,000 UNIT TAB] Take 1 tablet by mouth daily.      estradiol (VIVELLE-DOT) 0.0375 MG/24HR BIW patch Place 1 patch onto the skin twice a week      fluticasone (FLONASE) 50 MCG/ACT nasal spray Spray 1 spray into both nostrils daily 11.1 mL 0    progesterone (PROMETRIUM) 100 MG capsule Take 100 mg by mouth at bedtime      rizatriptan (MAXALT-MLT) 10 MG disintegrating tablet [RIZATRIPTAN (MAXALT-MLT) 10 MG DISINTEGRATING TABLET] PLACE 1 TABLET ON TOP OF TONGUE, DISSOLVE AND SWALLOW, MAY REPEAT EVERY 2 HOURS, MAX 3 TABS/24HRS 9 tablet 1     No current facility-administered medications for this visit.          Review of Systems  Constitutional, HEENT, cardiovascular, pulmonary, gi and gu systems are negative, except as otherwise noted.      Objective    /78   Pulse 90   Temp 98.3  F (36.8  C)   Resp 16   Wt 77.1 kg (170 lb)   SpO2 98%   BMI 32.12 kg/m      Physical Exam  Vitals reviewed.   Constitutional:       Appearance: She is not ill-appearing or diaphoretic.   HENT:      Nose: Congestion present.      Mouth/Throat:      Lips: Pink.      Pharynx: Uvula midline. No posterior oropharyngeal erythema.      Tonsils: No tonsillar exudate or tonsillar abscesses.   Cardiovascular:      Rate and Rhythm: Normal rate and regular rhythm.      Heart sounds:      No friction rub. No gallop.   Pulmonary:      Effort: Pulmonary effort is normal.      Breath sounds: No wheezing, rhonchi or rales.      Comments: No cough during exam  Lymphadenopathy:      Cervical: No cervical adenopathy.   Neurological:      Mental Status: She is alert.          Results for orders placed or performed in visit on 06/20/24   XR Chest 2 Views     Status: None    Narrative    EXAM: XR CHEST 2 VIEWS  LOCATION: [facility]  DATE: 6/20/2024    INDICATION:  > 1mo cough after COVID infection  COMPARISON: PA view of the chest 1/25/2024      Impression    IMPRESSION:     The cardiac silhouette is normal in size. Hilar and mediastinal interfaces are normal.    The lungs are symmetrically inflated. There are no airspace or interstitial opacities.    Diaphragm curvature is normal. No pleural fluid is present.    Small thoracic spine degenerative osteophytes are present.    There are multiple surgical clips in the epigastrium/left upper quadrant.        XR Chest 2 Views    Result Date: 6/20/2024  EXAM: XR CHEST 2 VIEWS LOCATION: [facility] DATE: 6/20/2024 INDICATION: > 1mo cough after COVID infection COMPARISON: PA view of the chest 1/25/2024     IMPRESSION: The cardiac silhouette is normal in size. Hilar and mediastinal interfaces are normal. The lungs are symmetrically inflated. There are no airspace or interstitial opacities. Diaphragm curvature is normal. No pleural fluid is present. Small thoracic spine degenerative osteophytes are present. There are multiple surgical clips in the epigastrium/left upper quadrant.           The use of Dragon/Realius dictation services may have been used to construct the content in this note; any grammatical or spelling errors are non-intentional. Please contact the author of this note directly if you are in need of any clarification.

## 2024-06-23 ENCOUNTER — HEALTH MAINTENANCE LETTER (OUTPATIENT)
Age: 59
End: 2024-06-23

## 2024-06-25 ENCOUNTER — VIRTUAL VISIT (OUTPATIENT)
Dept: INTERNAL MEDICINE | Facility: CLINIC | Age: 59
End: 2024-06-25
Payer: COMMERCIAL

## 2024-06-25 ENCOUNTER — LAB (OUTPATIENT)
Dept: LAB | Facility: CLINIC | Age: 59
End: 2024-06-25
Payer: COMMERCIAL

## 2024-06-25 DIAGNOSIS — R05.3 PERSISTENT COUGH: ICD-10-CM

## 2024-06-25 DIAGNOSIS — G43.E19 INTRACTABLE CHRONIC MIGRAINE WITH AURA AND WITHOUT STATUS MIGRAINOSUS: ICD-10-CM

## 2024-06-25 DIAGNOSIS — J06.9 UPPER RESPIRATORY TRACT INFECTION, UNSPECIFIED TYPE: Primary | ICD-10-CM

## 2024-06-25 LAB
C PNEUM DNA SPEC QL NAA+PROBE: NOT DETECTED
DEPRECATED S PYO AG THROAT QL EIA: NEGATIVE
FLUAV H1 2009 PAND RNA SPEC QL NAA+PROBE: NOT DETECTED
FLUAV H1 RNA SPEC QL NAA+PROBE: NOT DETECTED
FLUAV H3 RNA SPEC QL NAA+PROBE: NOT DETECTED
FLUAV RNA SPEC QL NAA+PROBE: NOT DETECTED
FLUBV RNA SPEC QL NAA+PROBE: NOT DETECTED
GROUP A STREP BY PCR: NOT DETECTED
HADV DNA SPEC QL NAA+PROBE: NOT DETECTED
HCOV PNL SPEC NAA+PROBE: NOT DETECTED
HMPV RNA SPEC QL NAA+PROBE: NOT DETECTED
HPIV1 RNA SPEC QL NAA+PROBE: NOT DETECTED
HPIV2 RNA SPEC QL NAA+PROBE: NOT DETECTED
HPIV3 RNA SPEC QL NAA+PROBE: NOT DETECTED
HPIV4 RNA SPEC QL NAA+PROBE: NOT DETECTED
M PNEUMO DNA SPEC QL NAA+PROBE: NOT DETECTED
RSV RNA SPEC QL NAA+PROBE: NOT DETECTED
RSV RNA SPEC QL NAA+PROBE: NOT DETECTED
RV+EV RNA SPEC QL NAA+PROBE: NOT DETECTED

## 2024-06-25 PROCEDURE — G2211 COMPLEX E/M VISIT ADD ON: HCPCS | Mod: 95

## 2024-06-25 PROCEDURE — 99215 OFFICE O/P EST HI 40 MIN: CPT | Mod: 95

## 2024-06-25 PROCEDURE — 87581 M.PNEUMON DNA AMP PROBE: CPT

## 2024-06-25 PROCEDURE — 87651 STREP A DNA AMP PROBE: CPT

## 2024-06-25 PROCEDURE — 87486 CHLMYD PNEUM DNA AMP PROBE: CPT

## 2024-06-25 PROCEDURE — 87633 RESP VIRUS 12-25 TARGETS: CPT

## 2024-06-25 RX ORDER — CODEINE PHOSPHATE AND GUAIFENESIN 10; 100 MG/5ML; MG/5ML
1-2 SOLUTION ORAL EVERY 4 HOURS PRN
Qty: 118 ML | Refills: 0 | Status: SHIPPED | OUTPATIENT
Start: 2024-06-25

## 2024-06-25 RX ORDER — RIZATRIPTAN BENZOATE 10 MG/1
10 TABLET ORAL
Qty: 9 TABLET | Refills: 0 | Status: SHIPPED | OUTPATIENT
Start: 2024-06-25 | End: 2024-09-10

## 2024-06-25 NOTE — PROGRESS NOTES
Alanna is a 59 year old who is being evaluated via a billable video visit.    How would you like to obtain your AVS? Mail a copy  If the video visit is dropped, the invitation should be resent by: Text to cell phone: [phone]  Will anyone else be joining your video visit? No      Assessment & Plan     (J06.9) Upper respiratory tract infection, unspecified type  (primary encounter diagnosis)  Comment: Patient reports persistent productive cough that keeps her up at night, nasal congestion, and pharyngitis that just started.  Plan: Respiratory Panel PCR, Rapid strep group A         screen POCT, Enter/Edit Result        Results pending and expected for patient to visit the [facility]    (G43.E19) Intractable chronic migraine with aura and without status migrainosus  Comment: Patient is only one rizatriptan left and needed a refill  Plan: rizatriptan (MAXALT) 10 MG tablet        Prescription sent    (R05.3) Persistent cough  Comment: Patient's cough started off as a persistent productive cough and now has established as a productive cough that she has hard time getting up, a cough that keeps her up at night  Plan: guaiFENesin-codeine (ROBITUSSIN AC) 100-10         MG/5ML solution        Prescription sent        MED REC REQUIRED  Post Medication Reconciliation Status:       MEDICATIONS:   Orders Placed This Encounter   Medications    guaiFENesin-codeine (ROBITUSSIN AC) 100-10 MG/5ML solution     Sig: Take 5-10 mLs by mouth every 4 hours as needed for cough     Dispense:  118 mL     Refill:  0    rizatriptan (MAXALT) 10 MG tablet     Sig: Take 1 tablet (10 mg) by mouth at onset of headache for migraine     Dispense:  9 tablet     Refill:  0     Medications Discontinued During This Encounter   Medication Reason    rizatriptan (MAXALT-MLT) 10 MG disintegrating tablet Reorder (No AVS)          - Continue other medications without change    Subjective   Alanna is a 59 year old, presenting for the following  "health issues: Pt was at  for cough. Pt was positive for COVID in 3/2024. Pt had persistent unproductive cough. Pt was given benzonatate for dry cough that was not effective. Pt has productive cough. Cough is keeping her up at night. Pt reports that yesterday her sore throat started. Pt reports nasal congestion. Pt denies a headache. Pt denies any fevers, night sweats or chills. Pt reports dysphagia when swallowing anything and is rated as 5-6/10 described as burning pain.      Discussed with the patient of getting a strep throat swab and respiratory viral panel.  Discussed the importance of taking the guaifenesin with codeine at night to help her sleep but to not take it during the daytime and before she drives a vehicle due to concern of sleepiness.    Explained to patient that depending upon the culture results will determine if she gets prescribed an antibiotic or not      Pt works at the hospital   Follow Up        6/25/2024     8:00 AM   Additional Questions   Roomed by Flavioi   Accompanied by Self     HPI     ED/ Followup:    Facility:[facility]  Date of visit: 06-  Reason for visit:   Considered long COVID vs allergies vs viral URI with cough. Sore throat most likely d/t post-nasal drip. Vitally stable and well appearing with benign exam. CXR without effusion, consolidation, or air atelectasis. No h/o GERD and endorses eating only bland food. We discussed the results of her imaging and exam. Recommended daily Zyrtec, Flonase, and follow up with PCP in 2 weeks if symptoms persist.       Current Status: Patient states \" she still has a cough that is getting worse\".        Review of Systems  Constitutional, HEENT, cardiovascular, pulmonary, gi and gu systems are negative, except as otherwise noted.      Objective           Vitals:  No vitals were obtained today due to virtual visit.    Physical Exam   GENERAL: alert and no distress  EYES: Eyes grossly normal to " inspection.  No discharge or erythema, or obvious scleral/conjunctival abnormalities.  RESP: No audible wheeze, cough, or visible cyanosis, pt persistently coughing   SKIN: Visible skin clear. No significant rash, abnormal pigmentation or lesions.  NEURO: Cranial nerves grossly intact.  Mentation and speech appropriate for age.  PSYCH: Appropriate affect, tone, and pace of words          Video-Visit Details    Type of service:  Video Visit   Originating Location (pt. Location): Home    Distant Location (provider location):  On-site  Platform used for Video Visit: Nicki  Signed Electronically by: ESTEFANY Noland CNP

## 2024-06-26 PROCEDURE — 99283 EMERGENCY DEPT VISIT LOW MDM: CPT

## 2024-06-26 ASSESSMENT — COLUMBIA-SUICIDE SEVERITY RATING SCALE - C-SSRS
6. HAVE YOU EVER DONE ANYTHING, STARTED TO DO ANYTHING, OR PREPARED TO DO ANYTHING TO END YOUR LIFE?: NO
2. HAVE YOU ACTUALLY HAD ANY THOUGHTS OF KILLING YOURSELF IN THE PAST MONTH?: NO
1. IN THE PAST MONTH, HAVE YOU WISHED YOU WERE DEAD OR WISHED YOU COULD GO TO SLEEP AND NOT WAKE UP?: NO

## 2024-06-27 ENCOUNTER — HOSPITAL ENCOUNTER (EMERGENCY)
Facility: CLINIC | Age: 59
Discharge: HOME | End: 2024-06-27
Attending: EMERGENCY MEDICINE | Admitting: EMERGENCY MEDICINE
Payer: COMMERCIAL

## 2024-06-27 VITALS
SYSTOLIC BLOOD PRESSURE: 129 MMHG | TEMPERATURE: 97.8 F | OXYGEN SATURATION: 98 % | DIASTOLIC BLOOD PRESSURE: 79 MMHG | HEIGHT: 61 IN | RESPIRATION RATE: 20 BRPM | BODY MASS INDEX: 33.34 KG/M2 | WEIGHT: 176.6 LBS | HEART RATE: 76 BPM

## 2024-06-27 DIAGNOSIS — J06.9 VIRAL URI: ICD-10-CM

## 2024-06-27 LAB
FLUAV RNA SPEC QL NAA+PROBE: NEGATIVE
FLUBV RNA RESP QL NAA+PROBE: NEGATIVE
GROUP A STREP BY PCR: NOT DETECTED
RSV RNA SPEC NAA+PROBE: NEGATIVE
SARS-COV-2 RNA RESP QL NAA+PROBE: NEGATIVE

## 2024-06-27 PROCEDURE — 250N000013 HC RX MED GY IP 250 OP 250 PS 637: Performed by: EMERGENCY MEDICINE

## 2024-06-27 PROCEDURE — 87637 SARSCOV2&INF A&B&RSV AMP PRB: CPT | Performed by: EMERGENCY MEDICINE

## 2024-06-27 PROCEDURE — 87651 STREP A DNA AMP PROBE: CPT | Performed by: EMERGENCY MEDICINE

## 2024-06-27 PROCEDURE — 250N000012 HC RX MED GY IP 250 OP 636 PS 637: Performed by: EMERGENCY MEDICINE

## 2024-06-27 RX ORDER — ACETAMINOPHEN 325 MG/1
650 TABLET ORAL ONCE
Status: COMPLETED | OUTPATIENT
Start: 2024-06-27 | End: 2024-06-27

## 2024-06-27 RX ADMIN — ACETAMINOPHEN 650 MG: 325 TABLET ORAL at 00:58

## 2024-06-27 RX ADMIN — DEXAMETHASONE 10 MG: 2 TABLET ORAL at 00:58

## 2024-06-27 ASSESSMENT — ACTIVITIES OF DAILY LIVING (ADL)
ADLS_ACUITY_SCORE: 35
ADLS_ACUITY_SCORE: 35

## 2024-06-27 NOTE — Clinical Note
Alanna Ling was seen and treated in our emergency department on 6/26/2024.  She may return to work on 06/28/2024.       If you have any questions or concerns, please don't hesitate to call.      Aidan Burden MD

## 2024-06-27 NOTE — ED TRIAGE NOTES
Pt arrives to ed with c/o of sore throat, and some difficulty breathing. Pt feels like she is struggling to swallow anything.      Triage Assessment (Adult)       Row Name 06/26/24 9153          Triage Assessment    Airway WDL WDL        Respiratory WDL    Respiratory WDL X;rhythm/pattern     Rhythm/Pattern, Respiratory shortness of breath

## 2024-06-27 NOTE — ED PROVIDER NOTES
EMERGENCY DEPARTMENT ENCOUNTER      NAME: Alanna Ling  AGE: 59 year old female  YOB: 1965  MRN: [id number]  EVALUATION DATE & TIME: 6/27/2024 12:20 AM    PCP: Gamaliel Mcdowell    ED PROVIDER: Aidan Burden M.D.      Chief Complaint   Patient presents with    Pharyngitis         FINAL IMPRESSION:  1. Viral URI          ED COURSE & MEDICAL DECISION MAKING:    Pertinent Labs & Imaging studies reviewed. (See chart for details)  59 year old female presents to the Emergency Department for evaluation of cough.  Has had this for about a week and a half.  Has had testing has been negative but no COVID testing.  COVID influenza RSV are negative here.  Strep is negative here.  No signs of throat swelling.  Able to tolerate fluids here.  No signs of esophageal injury.  Afebrile.  Seems likely viral URI.  Lungs are clear no signs of pneumonia.  Given Decadron for symptomatic relief.  Will also try Tylenol at home.  Follow-up with primary.  Did give the number for ENT if she needs it at this continues.  She will return for worsening symptoms.    12:44 AM I met with the patient to gather history and to perform my initial exam. I discussed the plan for care while in the Emergency Department.       At the conclusion of the encounter I discussed the results of all of the tests and the disposition. The questions were answered. The patient or family acknowledged understanding and was agreeable with the care plan.     Medical Decision Making  Obtained supplemental history:Supplemental history obtained?: No  Reviewed external records: External records reviewed?: Documented in chart  Care impacted by chronic illness:Diabetes and Hypertension  Care significantly affected by social determinants of health:N/A  Did you consider but not order tests?: Work up considered but not performed and documented in chart, if applicable  Did you interpret images independently?: Independent interpretation of ECG and images noted  in documentation, when applicable.  Consultation discussion with other provider:Did you involve another provider (consultant, , pharmacy, etc.)?: No  Discharge. No recommendations on prescription strength medication(s). See documentation for any additional details.           MEDICATIONS GIVEN IN THE EMERGENCY:  Medications   dexAMETHasone (DECADRON) tablet 10 mg (10 mg Oral $Given 6/27/24 0058)   acetaminophen (TYLENOL) tablet 650 mg (650 mg Oral $Given 6/27/24 0058)       NEW PRESCRIPTIONS STARTED AT TODAY'S ER VISIT  Discharge Medication List as of 6/27/2024  2:00 AM             =================================================================    HPI    Patient information was obtained from: patient     Use of : N/A         Alanna Ling is a 59 year old female with a pertinent history of tonsillectomy who presents to this ED for evaluation of pharyngitis.     The patient reports having a bad cough and pharyngitis for the past ten days, with her sore throat worsening over the past four days. She said her throat feels itchy and that her cough is residing in her lungs. She said earlier this evening she was struggling to pass saliva, and that it is painful.     Patient denies fevers, and no history of asthma.     Per review 6/20/2024, patient visit [facility] for cough. Chest XR without effusion, consolidation or air atelectasis. Recommended taking zyrtec and flonase.       PAST MEDICAL HISTORY:  History reviewed. No pertinent past medical history.    PAST SURGICAL HISTORY:  Past Surgical History:   Procedure Laterality Date    APPENDECTOMY      HC SLING OPERATION FOR STRESS INCONTINENCE N/A 12/19/2018    Procedure: RETROPUBIC SLING (LYNX), CYSTOSCOPY;  Surgeon: Yeyo Pickett MD;  Location: [facility];  Service: Urology    OTHER SURGICAL HISTORY  20 yrs ago    butt implants    TONSILLECTOMY             CURRENT MEDICATIONS:    No current facility-administered medications for  this encounter.     Current Outpatient Medications   Medication Sig Dispense Refill    benzonatate (TESSALON) 100 MG capsule Take 1 capsule (100 mg) by mouth 3 times daily as needed for cough 21 capsule 0    cetirizine (ZYRTEC) 10 MG tablet Take 1 tablet (10 mg) by mouth daily for 30 days 30 tablet 0    cholecalciferol, vitamin D3, (VITAMIN D3) 2,000 unit Tab [CHOLECALCIFEROL, VITAMIN D3, (VITAMIN D3) 2,000 UNIT TAB] Take 1 tablet by mouth daily.      estradiol (VIVELLE-DOT) 0.0375 MG/24HR BIW patch Place 1 patch onto the skin twice a week      fluticasone (FLONASE) 50 MCG/ACT nasal spray Spray 1 spray into both nostrils daily 11.1 mL 0    guaiFENesin-codeine (ROBITUSSIN AC) 100-10 MG/5ML solution Take 5-10 mLs by mouth every 4 hours as needed for cough 118 mL 0    progesterone (PROMETRIUM) 100 MG capsule Take 100 mg by mouth at bedtime      rizatriptan (MAXALT) 10 MG tablet Take 1 tablet (10 mg) by mouth at onset of headache for migraine 9 tablet 0         ALLERGIES:  Allergies   Allergen Reactions    Iodinated Contrast Media Hives    Ibuprofen Hives     blister on the face      Iodine Hives     IV Contrast    Nsaids (Non-Steroidal Anti-Inflammatory Drug) [Nsaids] Hives    Dulaglutide Rash       FAMILY HISTORY:  Family History   Problem Relation Age of Onset    Cancer Paternal Grandfather     Obesity Mother     Hypertension Mother     Arthritis Mother     Heart Disease Father     Hyperlipidemia Father     Hypertension Father     Cerebrovascular Disease Father     Obesity Sister     Hypertension Sister     Hyperlipidemia Brother     Hypertension Brother     Cancer Paternal Grandmother        SOCIAL HISTORY:   Social History     Socioeconomic History    Marital status:    Tobacco Use    Smoking status: Former     Passive exposure: Never    Smokeless tobacco: Never    Tobacco comments:     quit 30 yrs ago   Vaping Use    Vaping status: Never Used   Substance and Sexual Activity    Alcohol use: Yes     Comment:  "Alcoholic Drinks/day: Socially    Drug use: No    Sexual activity: Yes     Partners: Male     Birth control/protection: Other     Comment: PARTNER VASECTOMY       VITALS:  /79   Pulse 76   Temp 97.8  F (36.6  C) (Oral)   Resp 20   Ht 1.549 m (5' 1\")   Wt 80.1 kg (176 lb 9.6 oz)   LMP  (LMP Unknown)   SpO2 98%   BMI 33.37 kg/m      PHYSICAL EXAM    Physical Exam  Vitals and nursing note reviewed.   Constitutional:       General: She is not in acute distress.     Appearance: She is not diaphoretic.   HENT:      Head: Atraumatic.      Mouth/Throat:      Mouth: Mucous membranes are moist.      Pharynx: Uvula midline. No pharyngeal swelling, oropharyngeal exudate, posterior oropharyngeal erythema or uvula swelling.   Eyes:      General: No scleral icterus.     Pupils: Pupils are equal, round, and reactive to light.   Cardiovascular:      Rate and Rhythm: Normal rate and regular rhythm.      Heart sounds: Normal heart sounds.   Pulmonary:      Effort: No respiratory distress.      Breath sounds: Normal breath sounds.   Abdominal:      Palpations: Abdomen is soft.      Tenderness: There is no abdominal tenderness. There is no guarding or rebound. Negative signs include Teresa's sign.   Musculoskeletal:         General: No tenderness.   Skin:     General: Skin is warm.      Findings: No rash.   Neurological:      General: No focal deficit present.      Mental Status: She is alert.           LAB:  All pertinent labs reviewed and interpreted.  Labs Ordered and Resulted from Time of ED Arrival to Time of ED Departure   INFLUENZA A/B, RSV, & SARS-COV2 PCR - Normal       Result Value    Influenza A PCR Negative      Influenza B PCR Negative      RSV PCR Negative      SARS CoV2 PCR Negative     GROUP A STREPTOCOCCUS PCR THROAT SWAB - Normal    Group A strep by PCR Not Detected         RADIOLOGY:  Reviewed all pertinent imaging. Please see official radiology report.  No orders to display         Myra MCKEON, " am serving as a scribe to document services personally performed by Dr. Aidan Burden, based on my observation and the provider's statements to me. I, Aidan Burden MD attest that Myra Bo is acting in a scribe capacity, has observed my performance of the services and has documented them in accordance with my direction.    Aidan Burden M.D.  Emergency Medicine  [facility] EMERGENCY ROOM  [address] [address]  [phone]  Dept: [phone]       Aidan Burden MD  06/27/24 0234

## 2024-07-11 ENCOUNTER — TRANSFERRED RECORDS (OUTPATIENT)
Dept: MULTI SPECIALTY CLINIC | Facility: CLINIC | Age: 59
End: 2024-07-11

## 2024-07-11 LAB — PAP SMEAR - HIM PATIENT REPORTED: NORMAL

## 2024-09-01 ENCOUNTER — HEALTH MAINTENANCE LETTER (OUTPATIENT)
Age: 59
End: 2024-09-01

## 2024-09-10 ENCOUNTER — OFFICE VISIT (OUTPATIENT)
Dept: FAMILY MEDICINE | Facility: CLINIC | Age: 59
End: 2024-09-10
Payer: COMMERCIAL

## 2024-09-10 VITALS
TEMPERATURE: 98 F | SYSTOLIC BLOOD PRESSURE: 118 MMHG | HEIGHT: 61 IN | BODY MASS INDEX: 32.13 KG/M2 | WEIGHT: 170.2 LBS | HEART RATE: 80 BPM | DIASTOLIC BLOOD PRESSURE: 72 MMHG | RESPIRATION RATE: 20 BRPM | OXYGEN SATURATION: 97 %

## 2024-09-10 DIAGNOSIS — Z13.220 SCREENING FOR CHOLESTEROL LEVEL: ICD-10-CM

## 2024-09-10 DIAGNOSIS — I10 ESSENTIAL (PRIMARY) HYPERTENSION: ICD-10-CM

## 2024-09-10 DIAGNOSIS — R10.13 ABDOMINAL PAIN, EPIGASTRIC: ICD-10-CM

## 2024-09-10 DIAGNOSIS — G43.E19 INTRACTABLE CHRONIC MIGRAINE WITH AURA AND WITHOUT STATUS MIGRAINOSUS: ICD-10-CM

## 2024-09-10 DIAGNOSIS — K59.00 CONSTIPATION, UNSPECIFIED CONSTIPATION TYPE: ICD-10-CM

## 2024-09-10 DIAGNOSIS — Z98.84 STATUS POST BARIATRIC SURGERY: ICD-10-CM

## 2024-09-10 DIAGNOSIS — K21.00 GASTROESOPHAGEAL REFLUX DISEASE WITH ESOPHAGITIS, UNSPECIFIED WHETHER HEMORRHAGE: Primary | ICD-10-CM

## 2024-09-10 LAB
ALBUMIN SERPL BCG-MCNC: 4.6 G/DL (ref 3.5–5.2)
ALP SERPL-CCNC: 89 U/L (ref 40–150)
ALT SERPL W P-5'-P-CCNC: 25 U/L (ref 0–50)
ANION GAP SERPL CALCULATED.3IONS-SCNC: 9 MMOL/L (ref 7–15)
AST SERPL W P-5'-P-CCNC: 35 U/L (ref 0–45)
BILIRUB SERPL-MCNC: 0.7 MG/DL
BUN SERPL-MCNC: 13.3 MG/DL (ref 6–20)
CALCIUM SERPL-MCNC: 9.5 MG/DL (ref 8.8–10.4)
CHLORIDE SERPL-SCNC: 103 MMOL/L (ref 98–107)
CHOLEST SERPL-MCNC: 246 MG/DL
CREAT SERPL-MCNC: 0.74 MG/DL (ref 0.51–0.95)
EGFRCR SERPLBLD CKD-EPI 2021: >90 ML/MIN/1.73M2
FASTING STATUS PATIENT QL REPORTED: YES
FASTING STATUS PATIENT QL REPORTED: YES
GLUCOSE SERPL-MCNC: 96 MG/DL (ref 70–99)
HCO3 SERPL-SCNC: 26 MMOL/L (ref 22–29)
HDLC SERPL-MCNC: 63 MG/DL
LDLC SERPL CALC-MCNC: 159 MG/DL
MAGNESIUM SERPL-MCNC: 2.1 MG/DL (ref 1.7–2.3)
NONHDLC SERPL-MCNC: 183 MG/DL
POTASSIUM SERPL-SCNC: 4 MMOL/L (ref 3.4–5.3)
PROT SERPL-MCNC: 7.4 G/DL (ref 6.4–8.3)
SODIUM SERPL-SCNC: 138 MMOL/L (ref 135–145)
TRIGL SERPL-MCNC: 119 MG/DL
VIT B12 SERPL-MCNC: 548 PG/ML (ref 232–1245)
VIT D+METAB SERPL-MCNC: 38 NG/ML (ref 20–50)

## 2024-09-10 PROCEDURE — 83735 ASSAY OF MAGNESIUM: CPT | Performed by: NURSE PRACTITIONER

## 2024-09-10 PROCEDURE — 99215 OFFICE O/P EST HI 40 MIN: CPT | Performed by: NURSE PRACTITIONER

## 2024-09-10 PROCEDURE — 82607 VITAMIN B-12: CPT | Performed by: NURSE PRACTITIONER

## 2024-09-10 PROCEDURE — 82306 VITAMIN D 25 HYDROXY: CPT | Performed by: NURSE PRACTITIONER

## 2024-09-10 PROCEDURE — G2211 COMPLEX E/M VISIT ADD ON: HCPCS | Performed by: NURSE PRACTITIONER

## 2024-09-10 PROCEDURE — 80061 LIPID PANEL: CPT | Performed by: NURSE PRACTITIONER

## 2024-09-10 PROCEDURE — 80053 COMPREHEN METABOLIC PANEL: CPT | Performed by: NURSE PRACTITIONER

## 2024-09-10 PROCEDURE — 36415 COLL VENOUS BLD VENIPUNCTURE: CPT | Performed by: NURSE PRACTITIONER

## 2024-09-10 RX ORDER — POLYETHYLENE GLYCOL 3350 17 G/17G
1 POWDER, FOR SOLUTION ORAL DAILY
Qty: 850 G | Refills: 0 | Status: SHIPPED | OUTPATIENT
Start: 2024-09-10

## 2024-09-10 RX ORDER — RIZATRIPTAN BENZOATE 10 MG/1
10 TABLET ORAL
Qty: 9 TABLET | Refills: 3 | Status: SHIPPED | OUTPATIENT
Start: 2024-09-10

## 2024-09-10 NOTE — PROGRESS NOTES
Assessment & Plan     Gastroesophageal reflux disease with esophagitis, unspecified whether hemorrhage  Symptoms do sound like acid reflux. I think it would be reasonable to treat with omeprazole for 1-2 months and see if there is improvement.      Abdominal pain, epigastric  Will obtain metabolic panel to assess.   Due to recent travel-h.pylori is warranted as well. Discussed would need additional antibiotics.    - Comprehensive metabolic panel; Future  - omeprazole (PRILOSEC) 20 MG DR capsule; Take 1 capsule (20 mg) by mouth daily.  - Helicobacter pylori Antigen Stool; Future  - Comprehensive metabolic panel  - Helicobacter pylori Antigen Stool    Constipation, unspecified constipation type  Discussed increasing fiber in diet with plenty if water. If not improving with that would recommend miralax-discussed could do miralax twice a day until noted improvement in bowel Bms-ideally should have daily BM that is soft.   - polyethylene glycol (MIRALAX) 17 GM/Dose powder; Take 17 g (1 Capful) by mouth daily.    Status post bariatric surgery  Patient requested lab work from bariatric surgery-I ordered a few but did discuss that for more thorough investigation should follow up with bariatric surgery team as they are typically the ones who manage this post bariatric surgery.    - Magnesium; Future  - Vitamin B12; Future  - Vitamin D Deficiency; Future  - Magnesium  - Vitamin B12  - Vitamin D Deficiency    Essential (primary) hypertension  BP normal today in clinic    Intractable chronic migraine with aura and without status migrainosus  Refilling today for patient.    - rizatriptan (MAXALT) 10 MG tablet; Take 1 tablet (10 mg) by mouth at onset of headache for migraine.    Screening for cholesterol level  Screening for cholesterol. Last checked in 2022.     - Lipid panel reflex to direct LDL Fasting; Future  - Lipid panel reflex to direct LDL Fasting        I spent a total of 45 minutes on the day of the visit.   Time spent  "by me doing chart review, history and exam, documentation and further activities per the note    BMI  Estimated body mass index is 32.18 kg/m  as calculated from the following:    Height as of this encounter: 1.549 m (5' 0.98\").    Weight as of this encounter: 77.2 kg (170 lb 3.2 oz).             Yaz Mondragon is a 59 year old, presenting for the following health issues:  Heartburn (Since Sunday night. She did not try and medication yet.)        9/10/2024    11:31 AM   Additional Questions   Roomed by CHRIST Law     Via the Health Maintenance questionnaire, the patient has reported the following services have been completed -Eye Exam: [facility] 2023-11-01-Cervical Cancer Screening: [facility]l [facility] 2024-07-11, this information has been sent to the abstraction team.  History of Present Illness       Reason for visit:  Heartburn symptons  Symptom onset:  1-3 days ago   She is taking medications regularly.     Ate really poorly (wine, chocolate and chips) and then had heart burn like symptoms.   Patient reports this was a one time occurrence and then improved. Reports no ongoing symptoms. Patient reports has done minimal diet since and ease slowly into foods. Patient also reports constipation-no BM for a couple of days.    Patient also notes ongoing headache-patient does have history of migraines for which she takes maxalt.     Patient plans to establish care here.                   Objective    /72 (BP Location: Right arm, Patient Position: Sitting, Cuff Size: Adult Regular)   Pulse 80   Temp 98  F (36.7  C) (Oral)   Resp 20   Ht 1.549 m (5' 0.98\")   Wt 77.2 kg (170 lb 3.2 oz)   SpO2 97%   BMI 32.18 kg/m    Body mass index is 32.18 kg/m .  Physical Exam  Constitutional:       Appearance: Normal appearance.   Cardiovascular:      Rate and Rhythm: Normal rate and regular rhythm.   Pulmonary:      Effort: Pulmonary effort is normal.      Breath sounds: Normal breath sounds.   Abdominal:      General: " Abdomen is flat. Bowel sounds are normal.      Tenderness: There is abdominal tenderness (genearlized). There is no guarding.   Neurological:      General: No focal deficit present.      Mental Status: She is alert and oriented to person, place, and time.   Psychiatric:         Mood and Affect: Mood normal.         Behavior: Behavior normal.            No results found for any visits on 09/10/24.        Signed Electronically by: ESTEFANY FLOWERS CNP

## 2024-09-11 ENCOUNTER — ANCILLARY PROCEDURE (OUTPATIENT)
Dept: MAMMOGRAPHY | Facility: CLINIC | Age: 59
End: 2024-09-11
Payer: COMMERCIAL

## 2024-09-11 DIAGNOSIS — Z12.31 SCREENING MAMMOGRAM, ENCOUNTER FOR: ICD-10-CM

## 2024-09-11 PROCEDURE — 77063 BREAST TOMOSYNTHESIS BI: CPT | Mod: TC | Performed by: RADIOLOGY

## 2024-09-11 PROCEDURE — 77067 SCR MAMMO BI INCL CAD: CPT | Mod: TC | Performed by: RADIOLOGY

## 2024-11-10 ENCOUNTER — HEALTH MAINTENANCE LETTER (OUTPATIENT)
Age: 59
End: 2024-11-10

## 2025-03-02 ENCOUNTER — HEALTH MAINTENANCE LETTER (OUTPATIENT)
Age: 60
End: 2025-03-02

## 2025-06-21 ENCOUNTER — HEALTH MAINTENANCE LETTER (OUTPATIENT)
Age: 60
End: 2025-06-21

## 2025-06-25 ENCOUNTER — HOSPITAL ENCOUNTER (EMERGENCY)
Facility: CLINIC | Age: 60
Discharge: HOME | End: 2025-06-25
Attending: EMERGENCY MEDICINE | Admitting: EMERGENCY MEDICINE
Payer: COMMERCIAL

## 2025-06-25 VITALS
WEIGHT: 170 LBS | OXYGEN SATURATION: 99 % | RESPIRATION RATE: 17 BRPM | DIASTOLIC BLOOD PRESSURE: 78 MMHG | SYSTOLIC BLOOD PRESSURE: 146 MMHG | TEMPERATURE: 98.1 F | HEART RATE: 78 BPM | BODY MASS INDEX: 32.14 KG/M2

## 2025-06-25 DIAGNOSIS — S61.218A: ICD-10-CM

## 2025-06-25 PROCEDURE — 99283 EMERGENCY DEPT VISIT LOW MDM: CPT

## 2025-06-25 PROCEDURE — 250N000013 HC RX MED GY IP 250 OP 250 PS 637: Performed by: EMERGENCY MEDICINE

## 2025-06-25 PROCEDURE — 272N000397 HC DRESSING QUICK CLOT 4X4

## 2025-06-25 PROCEDURE — 272N000004 HC RX 272: Performed by: EMERGENCY MEDICINE

## 2025-06-25 RX ORDER — TRAMADOL HYDROCHLORIDE 50 MG/1
50 TABLET ORAL EVERY 6 HOURS PRN
Qty: 12 TABLET | Refills: 0 | Status: SHIPPED | OUTPATIENT
Start: 2025-06-25 | End: 2025-06-28

## 2025-06-25 RX ORDER — ACETAMINOPHEN 325 MG/1
975 TABLET ORAL ONCE
Status: COMPLETED | OUTPATIENT
Start: 2025-06-25 | End: 2025-06-25

## 2025-06-25 RX ADMIN — Medication 1 EACH: at 21:47

## 2025-06-25 RX ADMIN — ACETAMINOPHEN 975 MG: 325 TABLET ORAL at 21:47

## 2025-06-25 ASSESSMENT — ACTIVITIES OF DAILY LIVING (ADL)
ADLS_ACUITY_SCORE: 41
ADLS_ACUITY_SCORE: 41

## 2025-06-25 ASSESSMENT — COLUMBIA-SUICIDE SEVERITY RATING SCALE - C-SSRS
1. IN THE PAST MONTH, HAVE YOU WISHED YOU WERE DEAD OR WISHED YOU COULD GO TO SLEEP AND NOT WAKE UP?: NO
2. HAVE YOU ACTUALLY HAD ANY THOUGHTS OF KILLING YOURSELF IN THE PAST MONTH?: NO
6. HAVE YOU EVER DONE ANYTHING, STARTED TO DO ANYTHING, OR PREPARED TO DO ANYTHING TO END YOUR LIFE?: NO

## 2025-06-26 NOTE — ED TRIAGE NOTES
Pt presents with finger laceration to right 5th finger while slicing vegetables. Posterior tip on 5th finger is in ice container. Pt reports dizziness. Pressure dressing reinforced in triage. Pt reports numbness in right hand. Tearful. VSS.

## 2025-06-26 NOTE — ED PROVIDER NOTES
EMERGENCY DEPARTMENT ENCOUnter      NAME: Alanna Ling  AGE: 60 year old female  YOB: 1965  MRN: [id number]  EVALUATION DATE & TIME: 6/25/2025  9:01 PM    PCP: Gamaliel Mcdowell    ED PROVIDER: Renetta Ledbetter MD      Chief Complaint   Patient presents with    finger laceration         FINAL IMPRESSION:  1. Laceration of finger, little, initial encounter          ED COURSE & MEDICAL DECISION MAKING:      In summary, the patient is a 60-year-old female that presents to the emergency department for evaluation of a left little finger laceration.  It was an avulsion laceration that did not require repair.  Her wound was cleansed and dressed in the emergency department.  Although her medical records demonstrated a last tetanus in 2014, she felt like she had had 1 within the past 10 years.  She will check with her primary care to see if this is the case and if not she will go in for a tetanus booster.    2125-evaluated patient.  Wound was cleansed and dressed with Surgicel, nonadherent gauze and tubular gauze.    Medical Decision Making  I reviewed the EMR: Outpatient Record: immunization records  Discharge. I prescribed additional prescription strength medication(s) as charted. See documentation for any additional details.    MIPS (CTPE, Dental pain, [place], Sinusitis, Asthma/COPD, Head Trauma): Not Applicable    SEPSIS: None          At the conclusion of the encounter I discussed the results of all of the tests and the disposition. The questions were answered. The patient or family acknowledged understanding and was agreeable with the care plan.         MEDICATIONS GIVEN IN THE EMERGENCY:  Medications   oxidized cellulose (SURGICEL FIBRILLAR) 1X2 inch pad 1 each (1 each Topical $Given 6/25/25 2147)   acetaminophen (TYLENOL) tablet 975 mg (975 mg Oral $Given 6/25/25 2147)       NEW PRESCRIPTIONS STARTED AT TODAY'S ER VISIT  Discharge Medication List as of 6/25/2025  9:50 PM              =================================================================    HPI        Alanna Ling is a 60 year old female with a pertinent history of hyperlipidemia, DMII, and anxiety who presents to this ED via walk-in for evaluation of a laceration.    Patient reports slicing her right second digit while cutting zucchinis. She reports right hand pain and numbness. She did slice her fingertip off. She is right hand dominant. She is unsure of when her last tetanus shot is. She is on 50mg of sertraline daily. She is allergic to NSAIDs and iodine. Denies any tobacco or alcohol use. Denies any prior medical history. No other complaints.      REVIEW OF SYSTEMS     Constitutional:  Denies fever or chills  HENT:  Denies sore throat   Respiratory:  Denies cough or shortness of breath   Cardiovascular:  Denies chest pain or palpitations  GI:  Denies abdominal pain, nausea, or vomiting  Musculoskeletal:  Reports right handed pain  Skin:  Denies rash   Neurologic:  Denies headache, focal weakness or sensory changes    All other systems reviewed and are negative      PAST MEDICAL HISTORY:  No past medical history on file.    PAST SURGICAL HISTORY:  Past Surgical History:   Procedure Laterality Date    APPENDECTOMY      HC SLING OPERATION FOR STRESS INCONTINENCE N/A 12/19/2018    Procedure: RETROPUBIC SLING (LYNX), CYSTOSCOPY;  Surgeon: Yeyo Pickett MD;  Location: [facility];  Service: Urology    OTHER SURGICAL HISTORY  20 yrs ago    butt implants    TONSILLECTOMY             CURRENT MEDICATIONS:    benzonatate (TESSALON) 100 MG capsule  cholecalciferol, vitamin D3, (VITAMIN D3) 2,000 unit Tab  estradiol (VIVELLE-DOT) 0.0375 MG/24HR BIW patch  fluticasone (FLONASE) 50 MCG/ACT nasal spray  guaiFENesin-codeine (ROBITUSSIN AC) 100-10 MG/5ML solution  omeprazole (PRILOSEC) 20 MG DR capsule  polyethylene glycol (MIRALAX) 17 GM/Dose powder  progesterone (PROMETRIUM) 100 MG capsule  rizatriptan (MAXALT) 10 MG  tablet  traMADol (ULTRAM) 50 MG tablet        ALLERGIES:  Allergies   Allergen Reactions    Iodinated Contrast Media Hives    Ibuprofen Hives     blister on the face      Iodine Hives     IV Contrast    Nsaids (Non-Steroidal Anti-Inflammatory Drug) [Nsaids] Hives    Dulaglutide Rash       FAMILY HISTORY:  Family History   Problem Relation Age of Onset    Cancer Paternal Grandfather     Obesity Mother     Hypertension Mother     Arthritis Mother     Heart Disease Father     Hyperlipidemia Father     Hypertension Father     Cerebrovascular Disease Father     Obesity Sister     Hypertension Sister     Hyperlipidemia Brother     Hypertension Brother     Cancer Paternal Grandmother        SOCIAL HISTORY:   Social History     Socioeconomic History    Marital status:    Tobacco Use    Smoking status: Former     Passive exposure: Never    Smokeless tobacco: Never    Tobacco comments:     quit 30 yrs ago   Vaping Use    Vaping status: Never Used   Substance and Sexual Activity    Alcohol use: Yes     Comment: Alcoholic Drinks/day: Socially    Drug use: No    Sexual activity: Yes     Partners: Male     Birth control/protection: Other     Comment: PARTNER VASECTOMY     Social Drivers of Health     Interpersonal Safety: Low Risk  (9/10/2024)    Interpersonal Safety     Do you feel physically and emotionally safe where you currently live?: Yes     Within the past 12 months, have you been hit, slapped, kicked or otherwise physically hurt by someone?: No     Within the past 12 months, have you been humiliated or emotionally abused in other ways by your partner or ex-partner?: No       VITALS:  Patient Vitals for the past 24 hrs:   BP Temp Temp src Pulse Resp SpO2 Weight   06/25/25 2215 (!) 146/78 -- -- 78 17 99 % --   06/25/25 2106 (!) 185/86 98.1  F (36.7  C) Temporal 83 (!) 35 100 % 77.1 kg (170 lb)       PHYSICAL EXAM    Constitutional:  Well developed, Well nourished,  HENT:  Normocephalic, Atraumatic, Bilateral  external ears normal, Oropharynx moist, Nose normal.   Neck:  Normal range of motion, No meningismus, No stridor.   Eyes:  EOMI, Conjunctiva normal, No discharge.   Respiratory:  Normal breath sounds, No respiratory distress, No wheezing, No chest tenderness.   Cardiovascular:  Normal heart rate, Normal rhythm, No murmurs  Musculoskeletal:  Neurovascularly intact distally, No edema, No tenderness, No cyanosis, Good range of motion in all major joints.   Integument:  Warm, Dry, No erythema, No rash.  1 cm avulsion laceration of distal ulnar aspect of left little finger with minimal bleeding  Lymphatic:  No lymphadenopathy noted.   Neurologic:  Alert & oriented x 3, Normal motor function, Normal sensory function, No focal deficits noted.   Psychiatric:  Affect normal, Judgment normal, Mood normal.       I, Shanelle Urena, am serving as a scribe to document services personally performed by Renetta Ledbetter MD, based on my observations and the provider's statements to me.  I, Renetta Ledbetter MD, attest that Shanelle Urena is acting in a scribe capacity, has observed my performance of the services and has documented them in accordance with my direction.    Renetta Ledbetter MD  Emergency Medicine  [facility] EMERGENCY ROOM  [address] [facility] [id number] [phone]  Dept: [phone]       Renetta Ledbetter MD  06/26/25 0140

## 2025-06-26 NOTE — DISCHARGE INSTRUCTIONS
Leave dressing in place until tomorrow evening  Wash wound twice daily, pat dry and apply antibiotic ointment like bacitracin, Neosporin or triple antibiotic ointment topped with a Band-Aid  If bleeding, then elevate and apply direct pressure for at least 15 minutes.  Rest ice and elevate the wound as much as possible over the next couple days  Tylenol 650 mg every 4 hours as needed for pain